# Patient Record
Sex: FEMALE | Race: WHITE | Employment: FULL TIME | URBAN - METROPOLITAN AREA
[De-identification: names, ages, dates, MRNs, and addresses within clinical notes are randomized per-mention and may not be internally consistent; named-entity substitution may affect disease eponyms.]

---

## 2017-02-06 DIAGNOSIS — F33.41 MAJOR DEPRESSIVE DISORDER, RECURRENT, IN PARTIAL REMISSION (H): Primary | ICD-10-CM

## 2017-02-06 DIAGNOSIS — F41.1 GENERALIZED ANXIETY DISORDER: ICD-10-CM

## 2017-02-06 NOTE — TELEPHONE ENCOUNTER
Fluoxetine 20 Mg Capsules     Last Written Prescription Date: 12/9/16  Last Fill Quantity: 60, # refills: 0  Last Office Visit with G primary care provider:  12/19/16        Last PHQ-9 score on record=   PHQ-9 SCORE 12/9/2016   Total Score -   Total Score 12

## 2017-02-07 NOTE — TELEPHONE ENCOUNTER
Pt was to return to clinic in January. Left message for patient to schedule. Short-term refill sent in.    LANCE Vela, RN  [facility]

## 2017-02-17 PROBLEM — F43.10 PTSD (POST-TRAUMATIC STRESS DISORDER): Status: ACTIVE | Noted: 2017-02-17

## 2017-02-20 ENCOUNTER — OFFICE VISIT (OUTPATIENT)
Dept: FAMILY MEDICINE | Facility: CLINIC | Age: 27
End: 2017-02-20
Payer: COMMERCIAL

## 2017-02-20 VITALS
HEIGHT: 66 IN | SYSTOLIC BLOOD PRESSURE: 112 MMHG | BODY MASS INDEX: 24.75 KG/M2 | HEART RATE: 81 BPM | OXYGEN SATURATION: 97 % | DIASTOLIC BLOOD PRESSURE: 66 MMHG | WEIGHT: 154 LBS | TEMPERATURE: 98.3 F | RESPIRATION RATE: 16 BRPM

## 2017-02-20 DIAGNOSIS — F90.2 ATTENTION DEFICIT HYPERACTIVITY DISORDER (ADHD), COMBINED TYPE: ICD-10-CM

## 2017-02-20 DIAGNOSIS — F41.1 GAD (GENERALIZED ANXIETY DISORDER): Primary | ICD-10-CM

## 2017-02-20 PROCEDURE — 99213 OFFICE O/P EST LOW 20 MIN: CPT | Performed by: NURSE PRACTITIONER

## 2017-02-20 RX ORDER — CITALOPRAM HYDROBROMIDE 20 MG/1
20 TABLET ORAL DAILY
Qty: 90 TABLET | Refills: 1 | Status: SHIPPED | OUTPATIENT
Start: 2017-02-20 | End: 2017-05-03

## 2017-02-20 NOTE — PROGRESS NOTES
"  SUBJECTIVE:                                                    Alondra Sanchez is a 26 year old female who presents to clinic today for the following health issues:      Medication Followup of Prozac      Taking Medication as prescribed: yes    Side Effects:  Unsure, loose stools, nightmares, yelling out in sleep    Medication Helping Symptoms:  Helping with energy but seems like its making her more restless     Has been diagnosed with PTSD in the past due to chaotic childhood, but doesn't feel that she is particularly having PTSD related symptoms- not experiencing nightmares, or re-experiencing traumas. Feels that she has an ongoing high level of anxiety due to school, but it has not been particularly any worse lately. Restlessness seemed to have started when she switched antidepressants. Ongoing jitteryness, shaking and tapping foot during class, can't sit still. Feels like her energy level has improved and she likes this side effect.  -------------------------------------    Problem list and histories reviewed & adjusted, as indicated.  Additional history: as documented    BP Readings from Last 3 Encounters:   02/20/17 112/66   12/14/16 109/73   12/09/16 112/65    Wt Readings from Last 3 Encounters:   02/20/17 154 lb (69.9 kg)   12/14/16 154 lb (69.9 kg)   12/09/16 153 lb 6.4 oz (69.6 kg)                  Labs reviewed in EPIC    ROS:  Constitutional, HEENT, cardiovascular, pulmonary, gi and gu systems are negative, except as otherwise noted.    OBJECTIVE:                                                    /66 (BP Location: Right arm)  Pulse 81  Temp 98.3  F (36.8  C) (Oral)  Resp 16  Ht 5' 6\" (1.676 m)  Wt 154 lb (69.9 kg)  LMP  (LMP Unknown)  SpO2 97%  BMI 24.86 kg/m2  Body mass index is 24.86 kg/(m^2).  GENERAL: healthy, alert and no distress  NECK: no adenopathy, no asymmetry, masses, or scars and thyroid normal to palpation  RESP: lungs clear to auscultation - no rales, rhonchi or wheezes  CV: " regular rate and rhythm, normal S1 S2, no S3 or S4, no murmur, click or rub, no peripheral edema and peripheral pulses strong  PSYCH: mentation appears normal, affect normal/bright    Diagnostic Test Results:  No results found for this or any previous visit (from the past 24 hour(s)).     ASSESSMENT/PLAN:                                                      Problem List Items Addressed This Visit     None      Visit Diagnoses     EMILI (generalized anxiety disorder)    -  Primary    Relevant Medications    citalopram (CELEXA) 20 MG tablet    Other Relevant Orders    MENTAL HEALTH REFERRAL    Attention deficit hyperactivity disorder (ADHD), combined type        Relevant Orders    MENTAL HEALTH REFERRAL         Will try switching to another antidepressant to see if sleep issues are perhaps a side effect of prozac. Encouraged her to follow up with psych.    DUNG Munoz [facility]

## 2017-02-20 NOTE — MR AVS SNAPSHOT
After Visit Summary   2/20/2017    Alondra Sanchez    MRN: [id number]           Patient Information     Date Of Birth          1990        Visit Information        Provider Department      2/20/2017 2:00 PM Suki Leon APRN CNP [facility] [place]        Today's Diagnoses     EMILI (generalized anxiety disorder)    -  1    Attention deficit hyperactivity disorder (ADHD), combined type           Follow-ups after your visit        Additional Services     MENTAL HEALTH REFERRAL       Your provider has referred you to: Behavioral Healthcare Providers Intake Scheduling [phone]   [email or web address]    All scheduling is subject to the client's specific insurance plan & benefits, provider/location availability, and provider clinical specialities.  Please arrive 15 minutes early for your first appointment and bring your completed paperwork.    Please be aware that coverage of these services is subject to the terms and limitations of your health insurance plan.  Call member services at your health plan with any benefit or coverage questions.                  Who to contact     If you have questions or need follow up information about today's clinic visit or your schedule please contact [facility] [place] directly at [phone].  Normal or non-critical lab and imaging results will be communicated to you by MyChart, letter or phone within 4 business days after the clinic has received the results. If you do not hear from us within 7 days, please contact the clinic through [employer] or phone. If you have a critical or abnormal lab result, we will notify you by phone as soon as possible.  Submit refill requests through [employer] or call your pharmacy and they will forward the refill request to us. Please allow 3 business days for your refill to be completed.          Additional Information About Your Visit        [employer] Information     [employer] gives you secure access to your  "electronic health record. If you see a primary care provider, you can also send messages to your care team and make appointments. If you have questions, please call your primary care clinic.  If you do not have a primary care provider, please call [phone] and they will assist you.        Care EveryWhere ID     This is your Care EveryWhere ID. This could be used by other organizations to access your [place] medical records  [id number]        Your Vitals Were     Pulse Temperature Respirations Height Last Period Pulse Oximetry    81 98.3  F (36.8  C) (Oral) 16 5' 6\" (1.676 m) (LMP Unknown) 97%    BMI (Body Mass Index)                   24.86 kg/m2            Blood Pressure from Last 3 Encounters:   02/20/17 112/66   12/14/16 109/73   12/09/16 112/65    Weight from Last 3 Encounters:   02/20/17 154 lb (69.9 kg)   12/14/16 154 lb (69.9 kg)   12/09/16 153 lb 6.4 oz (69.6 kg)              We Performed the Following     MENTAL HEALTH REFERRAL          Today's Medication Changes          These changes are accurate as of: 2/20/17  2:59 PM.  If you have any questions, ask your nurse or doctor.               Start taking these medicines.        Dose/Directions    citalopram 20 MG tablet   Commonly known as:  celeXA   Used for:  EMILI (generalized anxiety disorder)   Started by:  Suki Leon APRN CNP        Dose:  20 mg   Take 1 tablet (20 mg) by mouth daily   Quantity:  90 tablet   Refills:  1         Stop taking these medicines if you haven't already. Please contact your care team if you have questions.     FLUoxetine 20 MG capsule   Commonly known as:  PROzac   Stopped by:  Suki Leon APRN CNP                Where to get your medicines      These medications were sent to [facility] [address] [place] [address] AT [address] [place] [address] & [address] [address]    Hours:  24-hours Phone:  [phone]     citalopram 20 MG tablet                " Primary Care Provider Office Phone # Fax #    DUNG Wilkins [phone]       [facility] [address] [address]        Thank you!     Thank you for choosing [facility]  for your care. Our goal is always to provide you with excellent care. Hearing back from our patients is one way we can continue to improve our services. Please take a few minutes to complete the written survey that you may receive in the mail after your visit with us. Thank you!             Your Updated Medication List - Protect others around you: Learn how to safely use, store and throw away your medicines at www.disposemymeds.org.          This list is accurate as of: 2/20/17  2:59 PM.  Always use your most recent med list.                   Brand Name Dispense Instructions for use    citalopram 20 MG tablet    celeXA    90 tablet    Take 1 tablet (20 mg) by mouth daily       Fish Oil 500 MG Caps      Take 2 capsules by mouth daily.       multivitamin per tablet      Take 1 tablet by mouth daily.       nitrofurantoin (macrocrystal-monohydrate) 100 MG capsule    MACROBID    14 capsule    Take 1 capsule (100 mg) by mouth 2 times daily       norethindrone-ethinyl estradiol 1-20 MG-MCG per tablet    JUNEL FE 1/20    84 tablet    Take 1 tablet by mouth daily       SOLUBLE FIBER/PROBIOTICS PO          VITAMIN D PO      Take 5,000 Int'l Units by mouth

## 2017-02-20 NOTE — NURSING NOTE
"Chief Complaint   Patient presents with     Recheck Medication     Prozac       Initial /66 (BP Location: Right arm)  Pulse 81  Temp 98.3  F (36.8  C) (Oral)  Resp 16  Ht 5' 6\" (1.676 m)  Wt 154 lb (69.9 kg)  LMP  (LMP Unknown)  SpO2 97%  BMI 24.86 kg/m2 Estimated body mass index is 24.86 kg/(m^2) as calculated from the following:    Height as of this encounter: 5' 6\" (1.676 m).    Weight as of this encounter: 154 lb (69.9 kg).  Medication Reconciliation: complete     Leonora Bazzi CMA      "

## 2017-03-30 NOTE — PROGRESS NOTES
"Alondra Sanchez is a 26 year old  female, accompanied by her self for a medication check and ADHD and anxiety follow up.      CURRENT CONCERNS:  Want to get on the meds for ADHD  Prozac was great for energy, but \"put the H in ADHD\" and also having night terrors.  Switched from prozac 20 mg to celexa 20 mg one month ago.  Finds it more helpful with anxiety, but also more tired and hard time concentrating.  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    Review of past medical history:     Attention deficit hyperactivity disorder, combined type  Major depressive disorder, recurrent, in partial remission (H)  Generalized anxiety disorder     CURRENT PRESCRIPTIONS:  Celexa 20 Mg      MEDICATION BENEFITS:  Controlled symptoms:  Hyperactivity - motor restlessness and anxiety  Uncontrolled symptoms:  Distractability and Finishing tasks     MEDICATION SIDE EFFECTS:   Has:  Fatigue and tiredness  Denies:  none      OBJECTIVE:  /71 (BP Location: Left arm, Patient Position: Chair, Cuff Size: Adult Regular)  Pulse 80  Temp 96.3  F (35.7  C) (Oral)  Wt 161 lb 3.2 oz (73.1 kg)  SpO2 100%  BMI 26.02 kg/m2  EXAM:GENERAL:  Alert and interactive., EYES:  Normal extra-ocular movements.  PERRLA, LUNGS:  Clear, HEART:  Normal rate and rhythm.  Normal S1 and S2.  No murmurs., ABDOMEN:  Soft, non-tender, no organomegaly., NEURO:  No tics or tremor.  Normal tone and strength. Normal gait and balance.  and MENTAL STATUS EXAM  Appearance: appropriate  Attitude: cooperative  Behavior: normal  Eye Contact: normal  Speech: normal  Orientation: oriented to person , place, time and situation  Mood:  admits anxiety most of time  Affect: normal, bright  Thought Process: clear  Suicidal Ideation: reports no thoughts, no intention  Hallucination: no     ASSESSMENT/PLAN:  (F90.2) Attention deficit hyperactivity disorder, combined type  (primary encounter diagnosis)  Comment:   Plan: amphetamine-dextroamphetamine (ADDERALL XR) 10 "         MG per 24 hr capsule    Interested in starting adderall at this time.  Would like low dose.  Follow-up in one month    (F33.41) Major depressive disorder, recurrent, in partial remission (H)  Comment:   Plan: continue celexa at current dose as starting adderall.  Discussed that we can increase if needed.    (F41.1) Generalized anxiety disorder  Comment:   Plan:       EVAN Mancia

## 2017-03-31 ENCOUNTER — OFFICE VISIT (OUTPATIENT)
Dept: FAMILY MEDICINE | Facility: CLINIC | Age: 27
End: 2017-03-31
Payer: COMMERCIAL

## 2017-03-31 VITALS
DIASTOLIC BLOOD PRESSURE: 71 MMHG | WEIGHT: 161.2 LBS | BODY MASS INDEX: 26.02 KG/M2 | TEMPERATURE: 96.3 F | SYSTOLIC BLOOD PRESSURE: 108 MMHG | HEART RATE: 80 BPM | OXYGEN SATURATION: 100 %

## 2017-03-31 DIAGNOSIS — F33.41 MAJOR DEPRESSIVE DISORDER, RECURRENT, IN PARTIAL REMISSION (H): ICD-10-CM

## 2017-03-31 DIAGNOSIS — F41.1 GENERALIZED ANXIETY DISORDER: ICD-10-CM

## 2017-03-31 DIAGNOSIS — F90.2 ATTENTION DEFICIT HYPERACTIVITY DISORDER, COMBINED TYPE: Primary | ICD-10-CM

## 2017-03-31 PROCEDURE — 99214 OFFICE O/P EST MOD 30 MIN: CPT | Performed by: NURSE PRACTITIONER

## 2017-03-31 RX ORDER — DEXTROAMPHETAMINE SACCHARATE, AMPHETAMINE ASPARTATE MONOHYDRATE, DEXTROAMPHETAMINE SULFATE AND AMPHETAMINE SULFATE 2.5; 2.5; 2.5; 2.5 MG/1; MG/1; MG/1; MG/1
10 CAPSULE, EXTENDED RELEASE ORAL DAILY
Qty: 30 CAPSULE | Refills: 0 | Status: SHIPPED | OUTPATIENT
Start: 2017-03-31 | End: 2017-05-03

## 2017-03-31 NOTE — PATIENT INSTRUCTIONS
Start adderall XL 10 mg daily - caution taking later in the morning as it can impair sleep    Return in one month

## 2017-03-31 NOTE — MR AVS SNAPSHOT
After Visit Summary   3/31/2017    Alondra Sanchez    MRN: [id number]           Patient Information     Date Of Birth          1990        Visit Information        Provider Department      3/31/2017 10:30 AM Mirtha Herrera APRN CNP [facility]        Today's Diagnoses     Attention deficit hyperactivity disorder, combined type    -  1    Major depressive disorder, recurrent, in partial remission (H)        Generalized anxiety disorder          Care Instructions    Start adderall XL 10 mg daily - caution taking later in the morning as it can impair sleep    Return in one month        Follow-ups after your visit        Who to contact     If you have questions or need follow up information about today's clinic visit or your schedule please contact [facility] directly at [phone].  Normal or non-critical lab and imaging results will be communicated to you by MyChart, letter or phone within 4 business days after the clinic has received the results. If you do not hear from us within 7 days, please contact the clinic through [employer] or phone. If you have a critical or abnormal lab result, we will notify you by phone as soon as possible.  Submit refill requests through [employer] or call your pharmacy and they will forward the refill request to us. Please allow 3 business days for your refill to be completed.          Additional Information About Your Visit        MyChart Information     [employer] gives you secure access to your electronic health record. If you see a primary care provider, you can also send messages to your care team and make appointments. If you have questions, please call your primary care clinic.  If you do not have a primary care provider, please call [phone] and they will assist you.        Care EveryWhere ID     This is your Care EveryWhere ID. This could be used by other organizations to access your [place] medical records  [id number]         Your Vitals Were     Pulse Temperature Pulse Oximetry BMI (Body Mass Index)          80 96.3  F (35.7  C) (Oral) 100% 26.02 kg/m2         Blood Pressure from Last 3 Encounters:   03/31/17 108/71   02/20/17 112/66   12/14/16 109/73    Weight from Last 3 Encounters:   03/31/17 161 lb 3.2 oz (73.1 kg)   02/20/17 154 lb (69.9 kg)   12/14/16 154 lb (69.9 kg)              Today, you had the following     No orders found for display         Today's Medication Changes          These changes are accurate as of: 3/31/17 11:17 AM.  If you have any questions, ask your nurse or doctor.               Start taking these medicines.        Dose/Directions    amphetamine-dextroamphetamine 10 MG per 24 hr capsule   Commonly known as:  ADDERALL XR   Used for:  Attention deficit hyperactivity disorder, combined type   Started by:  Mirtha Herrera APRN CNP        Dose:  10 mg   Take 1 capsule (10 mg) by mouth daily   Quantity:  30 capsule   Refills:  0         Stop taking these medicines if you haven't already. Please contact your care team if you have questions.     nitrofurantoin (macrocrystal-monohydrate) 100 MG capsule   Commonly known as:  MACROBID   Stopped by:  Mirtha Herrera APRN CNP                Where to get your medicines      Some of these will need a paper prescription and others can be bought over the counter.  Ask your nurse if you have questions.     Bring a paper prescription for each of these medications     amphetamine-dextroamphetamine 10 MG per 24 hr capsule                Primary Care Provider Office Phone # Fax #    DUNG Wilkins [phone]       [facility] [address]  [facility] [address]        Thank you!     Thank you for choosing [facility] [place]  for your care. Our goal is always to provide you with excellent care. Hearing back from our patients is one way we can continue to improve our services. Please take a few minutes to complete the written survey  that you may receive in the mail after your visit with us. Thank you!             Your Updated Medication List - Protect others around you: Learn how to safely use, store and throw away your medicines at www.disposemymeds.org.          This list is accurate as of: 3/31/17 11:17 AM.  Always use your most recent med list.                   Brand Name Dispense Instructions for use    amphetamine-dextroamphetamine 10 MG per 24 hr capsule    ADDERALL XR    30 capsule    Take 1 capsule (10 mg) by mouth daily       citalopram 20 MG tablet    celeXA    90 tablet    Take 1 tablet (20 mg) by mouth daily       Fish Oil 500 MG Caps      Take 2 capsules by mouth daily.       multivitamin per tablet      Take 1 tablet by mouth daily.       norethindrone-ethinyl estradiol 1-20 MG-MCG per tablet    JUNEL FE 1/20    84 tablet    Take 1 tablet by mouth daily       SOLUBLE FIBER/PROBIOTICS PO          VITAMIN D PO      Take 5,000 Int'l Units by mouth

## 2017-03-31 NOTE — NURSING NOTE
"Chief Complaint   Patient presents with     Behavioral Problem       Initial /71 (BP Location: Left arm, Patient Position: Chair, Cuff Size: Adult Regular)  Pulse 80  Temp 96.3  F (35.7  C) (Oral)  Wt 161 lb 3.2 oz (73.1 kg)  SpO2 100%  BMI 26.02 kg/m2 Estimated body mass index is 26.02 kg/(m^2) as calculated from the following:    Height as of 2/20/17: 5' 6\" (1.676 m).    Weight as of this encounter: 161 lb 3.2 oz (73.1 kg).  Medication Reconciliation: complete     Daryn Gurrola MA      "

## 2017-05-03 ENCOUNTER — OFFICE VISIT (OUTPATIENT)
Dept: FAMILY MEDICINE | Facility: CLINIC | Age: 27
End: 2017-05-03
Payer: COMMERCIAL

## 2017-05-03 VITALS
WEIGHT: 158.6 LBS | BODY MASS INDEX: 25.6 KG/M2 | TEMPERATURE: 98.5 F | SYSTOLIC BLOOD PRESSURE: 103 MMHG | OXYGEN SATURATION: 98 % | DIASTOLIC BLOOD PRESSURE: 70 MMHG | HEART RATE: 91 BPM

## 2017-05-03 DIAGNOSIS — F33.41 MAJOR DEPRESSIVE DISORDER, RECURRENT, IN PARTIAL REMISSION (H): Primary | ICD-10-CM

## 2017-05-03 DIAGNOSIS — F90.2 ATTENTION DEFICIT HYPERACTIVITY DISORDER, COMBINED TYPE: ICD-10-CM

## 2017-05-03 DIAGNOSIS — F41.1 GENERALIZED ANXIETY DISORDER: ICD-10-CM

## 2017-05-03 PROCEDURE — 99214 OFFICE O/P EST MOD 30 MIN: CPT | Performed by: NURSE PRACTITIONER

## 2017-05-03 RX ORDER — DEXTROAMPHETAMINE SACCHARATE, AMPHETAMINE ASPARTATE MONOHYDRATE, DEXTROAMPHETAMINE SULFATE AND AMPHETAMINE SULFATE 2.5; 2.5; 2.5; 2.5 MG/1; MG/1; MG/1; MG/1
10 CAPSULE, EXTENDED RELEASE ORAL DAILY
Qty: 30 CAPSULE | Refills: 0 | Status: SHIPPED | OUTPATIENT
Start: 2017-07-04 | End: 2017-08-03

## 2017-05-03 RX ORDER — DEXTROAMPHETAMINE SACCHARATE, AMPHETAMINE ASPARTATE MONOHYDRATE, DEXTROAMPHETAMINE SULFATE AND AMPHETAMINE SULFATE 2.5; 2.5; 2.5; 2.5 MG/1; MG/1; MG/1; MG/1
10 CAPSULE, EXTENDED RELEASE ORAL DAILY
Qty: 30 CAPSULE | Refills: 0 | Status: SHIPPED | OUTPATIENT
Start: 2017-05-03 | End: 2017-05-03

## 2017-05-03 RX ORDER — DEXTROAMPHETAMINE SACCHARATE, AMPHETAMINE ASPARTATE MONOHYDRATE, DEXTROAMPHETAMINE SULFATE AND AMPHETAMINE SULFATE 2.5; 2.5; 2.5; 2.5 MG/1; MG/1; MG/1; MG/1
10 CAPSULE, EXTENDED RELEASE ORAL DAILY
Qty: 30 CAPSULE | Refills: 0 | Status: SHIPPED | OUTPATIENT
Start: 2017-05-03 | End: 2017-06-02

## 2017-05-03 RX ORDER — FLUOXETINE 10 MG/1
10 CAPSULE ORAL DAILY
Qty: 90 CAPSULE | Refills: 1 | Status: SHIPPED | OUTPATIENT
Start: 2017-05-03 | End: 2017-11-29

## 2017-05-03 RX ORDER — DEXTROAMPHETAMINE SACCHARATE, AMPHETAMINE ASPARTATE MONOHYDRATE, DEXTROAMPHETAMINE SULFATE AND AMPHETAMINE SULFATE 2.5; 2.5; 2.5; 2.5 MG/1; MG/1; MG/1; MG/1
10 CAPSULE, EXTENDED RELEASE ORAL DAILY
Qty: 30 CAPSULE | Refills: 0 | Status: SHIPPED | OUTPATIENT
Start: 2017-07-04 | End: 2017-05-03

## 2017-05-03 RX ORDER — DEXTROAMPHETAMINE SACCHARATE, AMPHETAMINE ASPARTATE MONOHYDRATE, DEXTROAMPHETAMINE SULFATE AND AMPHETAMINE SULFATE 2.5; 2.5; 2.5; 2.5 MG/1; MG/1; MG/1; MG/1
10 CAPSULE, EXTENDED RELEASE ORAL DAILY
Qty: 30 CAPSULE | Refills: 0 | Status: SHIPPED | OUTPATIENT
Start: 2017-06-03 | End: 2017-05-03

## 2017-05-03 RX ORDER — DEXTROAMPHETAMINE SACCHARATE, AMPHETAMINE ASPARTATE MONOHYDRATE, DEXTROAMPHETAMINE SULFATE AND AMPHETAMINE SULFATE 2.5; 2.5; 2.5; 2.5 MG/1; MG/1; MG/1; MG/1
10 CAPSULE, EXTENDED RELEASE ORAL DAILY
Qty: 30 CAPSULE | Refills: 0 | Status: SHIPPED | OUTPATIENT
Start: 2017-06-03 | End: 2017-07-03

## 2017-05-03 NOTE — NURSING NOTE
"Chief Complaint   Patient presents with     A.D.H.D       Initial /70 (BP Location: Left arm, Patient Position: Chair, Cuff Size: Adult Regular)  Pulse 91  Temp 98.5  F (36.9  C) (Oral)  Wt 158 lb 9.6 oz (71.9 kg)  SpO2 98%  BMI 25.6 kg/m2 Estimated body mass index is 25.6 kg/(m^2) as calculated from the following:    Height as of 2/20/17: 5' 6\" (1.676 m).    Weight as of this encounter: 158 lb 9.6 oz (71.9 kg).  Medication Reconciliation: complete     Usha Cardozo CMA    "

## 2017-05-03 NOTE — PROGRESS NOTES
"  SUBJECTIVE:                                                    Alondra Sanchez is a 26 year old female who is here today for a medication check and ADHD follow up.      CURRENT CONCERNS:  Would like to discuss increasing dosage of Adderall and possibly changing anti depressant because it makes her very tired.    First couple days felt mind was alert and body neutral  Noticing celexa causes fatigue.  Takes the dose in the afternoon after eating between 9-2 pm.  Having difficulty waking.  Drinking 2 large cups of coffee daily as well as a Red Bull.  Prozac had caused hyperactivity and impulsivity and possibly night terrors  Depression symptoms are very stable.  Medications are more for covering anxiety    CURRENT PRESCRIPTIONS:    Adderall XR 10 mg in the morning    MEDICATION BENEFITS:  Controlled symptoms:  Hyperactivity - motor restlessness, Attention span, Distractability, Finishing tasks, Impulse control, Peer relations and School failure  Uncontrolled symptoms:  None     MEDICATION SIDE EFFECTS:   Has:  appetite suppression  Denies:  weight loss, insomnia, tics, palpitations, stomach ache, headache, emotional lability, rebound irritability, drowsiness, \"zombie\" effect, growth suppression and dry mouth      Problem list and histories reviewed & adjusted, as indicated.  Additional history: as documented    ROS:  Constitutional, HEENT, cardiovascular, pulmonary, gi and gu systems are negative, except as otherwise noted.    OBJECTIVE:                                                    /70 (BP Location: Left arm, Patient Position: Chair, Cuff Size: Adult Regular)  Pulse 91  Temp 98.5  F (36.9  C) (Oral)  Wt 158 lb 9.6 oz (71.9 kg)  SpO2 98%  BMI 25.6 kg/m2      EXAM:GENERAL:  Alert and interactive.  EYES:  Normal extra-ocular movements.  PERRLA,   LUNGS:  Clear, HEART:  Normal rate and rhythm.  Normal S1 and S2.  No murmurs.,   ABDOMEN:  Soft, non-tender, no organomegaly.,   NEURO:  No tics or tremor.  " Normal tone and strength. Normal gait and balance  MENTAL STATUS EXAM  Appearance: appropriate  Attitude: cooperative  Behavior: normal  Ere Contact: normal  Speech: normal  Orientation: oriented to person , place, time and situation  Mood:  admits sadness and anxiety most of time  Affect: Mood Congruent  Thought Process: clear  Suicidal Ideation: reports thoughts, no intention  Hallucination: no    Diagnostic Test Results:  none      ASSESSMENT/PLAN:                                                    Depression; recurrent episode-- Full remission   Associated with the following complications:    None   Plan:  Medications:   SSRI - change from celexa to prozac      (F33.41) Major depressive disorder, recurrent, in partial remission (H)  (primary encounter diagnosis)  Comment:   Plan: DEPRESSION ACTION PLAN (DAP), FLUoxetine         (PROZAC) 10 MG capsule   Initially considered timing of celexa, however patient does take it later in the day and feels tired on waking.  Will go back to prozac with a short taper off celexa.  Return in 6 weeks.    (F41.1) Generalized anxiety disorder  Comment:   Plan: FLUoxetine (PROZAC) 10 MG capsule            (F90.2) Attention deficit hyperactivity disorder, combined type  Comment:   Plan: amphetamine-dextroamphetamine (ADDERALL XR) 10         MG per 24 hr capsule,         amphetamine-dextroamphetamine (ADDERALL XR) 10         MG per 24 hr capsule,         amphetamine-dextroamphetamine (ADDERALL XR) 10         MG per 24 hr capsule   no changes while changing SSRI, but increase may be warranted      Patient Instructions   Stop celexa and restart prozac at 10 mg  Continue adderall XR 10 mg  Return in 6 weeks      DUNG Valles [facility]

## 2017-05-03 NOTE — MR AVS SNAPSHOT
After Visit Summary   5/3/2017    Alondra Sanchez    MRN: [id number]           Patient Information     Date Of Birth          1990        Visit Information        Provider Department      5/3/2017 10:45 AM Mirtha Herrera APRN CNP [place]        Today's Diagnoses     Major depressive disorder, recurrent, in partial remission (H)    -  1    Generalized anxiety disorder        Attention deficit hyperactivity disorder, combined type          Care Instructions    Stop celexa and restart prozac at 10 mg  Continue adderall XR 10 mg  Return in 6 weeks        Follow-ups after your visit        Who to contact     If you have questions or need follow up information about today's clinic visit or your schedule please contact [facility] directly at [phone].  Normal or non-critical lab and imaging results will be communicated to you by MyChart, letter or phone within 4 business days after the clinic has received the results. If you do not hear from us within 7 days, please contact the clinic through Miyaobabeihart or phone. If you have a critical or abnormal lab result, we will notify you by phone as soon as possible.  Submit refill requests through Spiracur or call your pharmacy and they will forward the refill request to us. Please allow 3 business days for your refill to be completed.          Additional Information About Your Visit        MyChart Information     Spiracur gives you secure access to your electronic health record. If you see a primary care provider, you can also send messages to your care team and make appointments. If you have questions, please call your primary care clinic.  If you do not have a primary care provider, please call [phone] and they will assist you.        Care EveryWhere ID     This is your Care EveryWhere ID. This could be used by other organizations to access your [facility] medical records  [id number]        Your Vitals Were     Pulse  Temperature Pulse Oximetry BMI (Body Mass Index)          91 98.5  F (36.9  C) (Oral) 98% 25.6 kg/m2         Blood Pressure from Last 3 Encounters:   05/03/17 103/70   03/31/17 108/71   02/20/17 112/66    Weight from Last 3 Encounters:   05/03/17 158 lb 9.6 oz (71.9 kg)   03/31/17 161 lb 3.2 oz (73.1 kg)   02/20/17 154 lb (69.9 kg)              We Performed the Following     DEPRESSION ACTION PLAN (DAP)          Today's Medication Changes          These changes are accurate as of: 5/3/17 11:26 AM.  If you have any questions, ask your nurse or doctor.               Start taking these medicines.        Dose/Directions    FLUoxetine 10 MG capsule   Commonly known as:  PROzac   Used for:  Major depressive disorder, recurrent, in partial remission (H), Generalized anxiety disorder        Dose:  10 mg   Take 1 capsule (10 mg) by mouth daily   Quantity:  90 capsule   Refills:  1         These medicines have changed or have updated prescriptions.        Dose/Directions    * amphetamine-dextroamphetamine 10 MG per 24 hr capsule   Commonly known as:  ADDERALL XR   This may have changed:  Another medication with the same name was added. Make sure you understand how and when to take each.   Used for:  Attention deficit hyperactivity disorder, combined type        Dose:  10 mg   Take 1 capsule (10 mg) by mouth daily   Quantity:  30 capsule   Refills:  0       * amphetamine-dextroamphetamine 10 MG per 24 hr capsule   Commonly known as:  ADDERALL XR   This may have changed:  You were already taking a medication with the same name, and this prescription was added. Make sure you understand how and when to take each.   Used for:  Attention deficit hyperactivity disorder, combined type        Dose:  10 mg   Start taking on:  6/3/2017   Take 1 capsule (10 mg) by mouth daily   Quantity:  30 capsule   Refills:  0       * amphetamine-dextroamphetamine 10 MG per 24 hr capsule   Commonly known as:  ADDERALL XR   This may have changed:  You  were already taking a medication with the same name, and this prescription was added. Make sure you understand how and when to take each.   Used for:  Attention deficit hyperactivity disorder, combined type        Dose:  10 mg   Start taking on:  7/4/2017   Take 1 capsule (10 mg) by mouth daily   Quantity:  30 capsule   Refills:  0       * Notice:  This list has 3 medication(s) that are the same as other medications prescribed for you. Read the directions carefully, and ask your doctor or other care provider to review them with you.      Stop taking these medicines if you haven't already. Please contact your care team if you have questions.     citalopram 20 MG tablet   Commonly known as:  celeXA                Where to get your medicines      These medications were sent to [facility]/pharmacy #[id number] - [place], [place] - [address], [place] [address]     Phone:  [phone]     FLUoxetine 10 MG capsule         Some of these will need a paper prescription and others can be bought over the counter.  Ask your nurse if you have questions.     Bring a paper prescription for each of these medications     amphetamine-dextroamphetamine 10 MG per 24 hr capsule    amphetamine-dextroamphetamine 10 MG per 24 hr capsule    amphetamine-dextroamphetamine 10 MG per 24 hr capsule                Primary Care Provider Office Phone # Fax #    DUNG Wilkins [facility] [phone]       [facility] [address] [address]        Thank you!     Thank you for choosing [facility]  for your care. Our goal is always to provide you with excellent care. Hearing back from our patients is one way we can continue to improve our services. Please take a few minutes to complete the written survey that you may receive in the mail after your visit with us. Thank you!             Your Updated Medication List - Protect others around you: Learn how to safely use, store and  throw away your medicines at www.disposemymeds.org.          This list is accurate as of: 5/3/17 11:26 AM.  Always use your most recent med list.                   Brand Name Dispense Instructions for use    * amphetamine-dextroamphetamine 10 MG per 24 hr capsule    ADDERALL XR    30 capsule    Take 1 capsule (10 mg) by mouth daily       * amphetamine-dextroamphetamine 10 MG per 24 hr capsule   Start taking on:  6/3/2017    ADDERALL XR    30 capsule    Take 1 capsule (10 mg) by mouth daily       * amphetamine-dextroamphetamine 10 MG per 24 hr capsule   Start taking on:  7/4/2017    ADDERALL XR    30 capsule    Take 1 capsule (10 mg) by mouth daily       Fish Oil 500 MG Caps      Take 2 capsules by mouth daily Reported on 5/3/2017       FLUoxetine 10 MG capsule    PROzac    90 capsule    Take 1 capsule (10 mg) by mouth daily       multivitamin per tablet      Take 1 tablet by mouth daily.       norethindrone-ethinyl estradiol 1-20 MG-MCG per tablet    JUNEL FE 1/20    84 tablet    Take 1 tablet by mouth daily       SOLUBLE FIBER/PROBIOTICS PO          VITAMIN D PO      Take 5,000 Int'l Units by mouth       * Notice:  This list has 3 medication(s) that are the same as other medications prescribed for you. Read the directions carefully, and ask your doctor or other care provider to review them with you.

## 2017-05-04 ASSESSMENT — PATIENT HEALTH QUESTIONNAIRE - PHQ9: SUM OF ALL RESPONSES TO PHQ QUESTIONS 1-9: 7

## 2017-08-25 ENCOUNTER — TELEPHONE (OUTPATIENT)
Dept: FAMILY MEDICINE | Facility: CLINIC | Age: 27
End: 2017-08-25

## 2017-08-25 DIAGNOSIS — F90.2 ATTENTION DEFICIT HYPERACTIVITY DISORDER, COMBINED TYPE: Primary | ICD-10-CM

## 2017-08-25 NOTE — TELEPHONE ENCOUNTER
Controlled Substance Refill Request for ADDERALL 10MG XR  Problem List Complete:     checked in past 6 months?    LOV: 5/3/17  The medication is not on patients medication list. When she saw Charlene back in May she was given a script to try over the summer to see how it works and was told to follow up in 6 weeks. Over the course of time, patient did not have any insurance and was not able to follow up, but she is requesting to get a script for the adderall. Any questions she can be reached at [phone].

## 2017-08-25 NOTE — TELEPHONE ENCOUNTER
Patient is requesting refill of:    Amphetamine-dextroamphetamine (ADDERALL XR) 10 MG per 24 hr capsule    Patient was in on 5/3/2017 for clinic visit, per visit note she was to return in six weeks.     Per chart, patient was given three month supply on 5/3/17. Per , the only scripts for adderall that show were filled are on 5/3/17 and one from a previous visit on 4/1/17.    Called patient, she reports she was unable to fill scripts because she lost insurance coverage.     Thank you,   Leslie Christie RN  [facility]

## 2017-08-31 RX ORDER — DEXTROAMPHETAMINE SACCHARATE, AMPHETAMINE ASPARTATE MONOHYDRATE, DEXTROAMPHETAMINE SULFATE AND AMPHETAMINE SULFATE 2.5; 2.5; 2.5; 2.5 MG/1; MG/1; MG/1; MG/1
10 CAPSULE, EXTENDED RELEASE ORAL DAILY
Qty: 30 CAPSULE | Refills: 0 | Status: SHIPPED | OUTPATIENT
Start: 2017-08-31 | End: 2017-12-13 | Stop reason: ALTCHOICE

## 2017-08-31 NOTE — TELEPHONE ENCOUNTER
Spoke with Charlene, she is ok with refill, but wanted to know more about the insurance issue.     Spoke with pt and she said she switched to her 's insurance in jan and he quit. She still works at the hospital. She switched to a different unit and was a .5 and went to a .6. Now has insurance again.     Pt would like to  script when ready.     Script has been pended.    Mi Coon RN  [facility]

## 2017-09-01 ENCOUNTER — TELEPHONE (OUTPATIENT)
Dept: FAMILY MEDICINE | Facility: CLINIC | Age: 27
End: 2017-09-01

## 2017-09-01 NOTE — TELEPHONE ENCOUNTER
Left  for pt to return call to clinic    Requested she assure that we have all of her new insurance info    Call to pharmacy , what is the insurance issue, needs a prior auth    Lee Ann Oliva RN   [facility]

## 2017-09-01 NOTE — TELEPHONE ENCOUNTER
Route to MA pool    Pt needs a PA for her Adderall, she had a recent change in her insurance  Below is the insurance info    [employer], acct no. [id number],  ID number [id number]    RXBIN [id number], RX PCN [id number]    Card padilla Alondra Sanchez    [phone] customer service number    Lee Ann Oliva RN   [facility]

## 2017-09-01 NOTE — TELEPHONE ENCOUNTER
Pt came in to  script for her Adderall yesterday 8/31 and went to go fill it at [facility] [address] and the pharmacy won't fill it.    Pt states the pharmacy said it's an insurance issue and that Charlene Herrera needs to call insurance    Pt can be reached @ [phone] jhony

## 2017-09-05 NOTE — TELEPHONE ENCOUNTER
Pt called her insurance company to ask them about what needed to be done to approve the refill of the medication, and she stated that they told her someone from the clinic needed to call [employer] to renew the medication for the next year. The number she was given is [phone] ext. [phone], and she was told that should renew the medication for the next year. Correct insurance info is in pt's chart now.

## 2017-09-06 NOTE — TELEPHONE ENCOUNTER
Called insurance [employer] and initiated PA over the phone with Nikole, she will send over the PA to pharmacy for review and they will fax or send the determination of the PA back to clinic. Reference number is [id number].  Waiting for denial or approval from insurance.     Daryn Gurrola MA

## 2017-09-08 ENCOUNTER — TELEPHONE (OUTPATIENT)
Dept: FAMILY MEDICINE | Facility: CLINIC | Age: 27
End: 2017-09-08

## 2017-09-08 NOTE — TELEPHONE ENCOUNTER
Prior Authorization: Approved    Approved as of: 09/06/2017      Daryn Gurrola September 8, 2017 at 2:39 PM

## 2017-09-26 NOTE — TELEPHONE ENCOUNTER
Called plan [phone]  to check status of Prior authorization has been approved, approval dates 9/6/2017-9/6/2018  Called [facility] to notify them of PA approval.    Leonora Bazzi, CMA

## 2017-11-29 DIAGNOSIS — F33.41 MAJOR DEPRESSIVE DISORDER, RECURRENT, IN PARTIAL REMISSION (H): ICD-10-CM

## 2017-11-29 DIAGNOSIS — F41.1 GENERALIZED ANXIETY DISORDER: ICD-10-CM

## 2017-11-29 NOTE — TELEPHONE ENCOUNTER
FLUoxetine (PROZAC) 10 MG capsule      Last Written Prescription Date:  5/3/17  Last Fill Quantity: 90,   # refills: 1  Last Office Visit: 5/3/17  Future Office visit:       Routing refill request to provider for review/approval because:  Does not meet protocol criteria

## 2017-11-29 NOTE — TELEPHONE ENCOUNTER
Left VM for pt to return call to clinic    Due to be seen and needs PHQ9    Lee Ann Oliva, RN   [facility]

## 2017-11-30 RX ORDER — FLUOXETINE 10 MG/1
10 CAPSULE ORAL DAILY
Qty: 30 CAPSULE | Refills: 0 | Status: SHIPPED | OUTPATIENT
Start: 2017-11-30 | End: 2017-12-13

## 2017-11-30 NOTE — TELEPHONE ENCOUNTER
PHQ-9 SCORE 6/22/2016 12/9/2016 5/3/2017   Total Score - - -   Total Score 10 12 7     Left phone message and sent Appydrink message advising due for follow up in office visit per last office visit notes 6/3/2017 - advised 6 week F/U office visit - patient is overude    Medication is being filled for 1 time refill only due to:  Patient needs to be seen because overdue for follow up.   Signed Prescriptions:                        Disp   Refills    FLUoxetine (PROZAC) 10 MG capsule          30 cap*0        Sig: Take 1 capsule (10 mg) by mouth daily PLEASE CONTACT           CLINIC TO RECEIVE FUTURE REFILLS [phone]  Authorizing Provider: NIKOLAS SHI  Ordering User: MAIRA BASILIO RN

## 2017-12-12 NOTE — PROGRESS NOTES
"  SUBJECTIVE:                                                    Alondra Sanchez is a 26 year old female who is here today for a medication check and ADHD follow up.      CURRENT CONCERNS:  restlessness, difficulty paying attention, lack of sleep, short term memory issues      CURRENT PRESCRIPTIONS:    Adderall XR 10 mg in the morning     MEDICATION BENEFITS:  Controlled symptoms:  Finishing tasks  Uncontrolled symptoms:  Hyperactivity - motor restlessness, Attention span, Distractability, Impulse control, Frustration tolerance, Peer relations and School failure     MEDICATION SIDE EFFECTS:   Has:  rebound irritability, dry mouth and shakiness, loose stools   Denies:  appetite suppression, weight loss, insomnia, tics, palpitations, stomach ache, headache, emotional lability, drowsiness, \"zombie\" effect and growth suppression      Problem list and histories reviewed & adjusted, as indicated.  Additional history: as documented    Patient Active Problem List   Diagnosis     CARDIOVASCULAR SCREENING; LDL GOAL LESS THAN 130     Adjustment disorder with anxiety     Insomnia     Myopia     Regular astigmatism     Low back pain     Attention deficit hyperactivity disorder, combined type     Major depressive disorder, recurrent, in partial remission (H)     Generalized anxiety disorder     PTSD (post-traumatic stress disorder)     BP Readings from Last 6 Encounters:   05/03/17 103/70   03/31/17 108/71   02/20/17 112/66   12/14/16 109/73   12/09/16 112/65   09/20/16 105/72     Current Outpatient Prescriptions   Medication     FLUoxetine (PROZAC) 10 MG capsule     amphetamine-dextroamphetamine (ADDERALL XR) 10 MG per 24 hr capsule     Probiotic Product (SOLUBLE FIBER/PROBIOTICS PO)     Cholecalciferol (VITAMIN D PO)     Omega-3 Fatty Acids (FISH OIL) 500 MG CAPS     Multiple Vitamin (MULTIVITAMIN) per tablet     No current facility-administered medications for this visit.             ROS:  Constitutional, HEENT, " cardiovascular, pulmonary, gi and gu systems are negative, except as otherwise noted.      OBJECTIVE:                                                    There were no vitals taken for this visit.    Exam:  Constitutional: healthy, alert and no distress    Cardiovascular: negative, PMI normal. No lifts, heaves, or thrills. RRR. No murmurs, clicks gallops or rub  Respiratory: negative, Percussion normal. Good diaphragmatic excursion. Lungs clear  Gastrointestinal: Abdomen soft, non-tender. BS normal. No masses, organomegaly    Neurologic: Gait normal. Reflexes normal and symmetric. Sensation grossly WNL.  Psychiatric: mentation appears normal and affect normal/bright    MENTAL STATUS EXAM  Appearance: appropriate  Attitude: cooperative  Behavior: normal  Eyre Contact: normal  Speech: normal  Orientation: oreinted to person , place, time and situation  Mood:  normal  Affect: Mood Congruient  Thought Process: clear      Diagnostic Test Results:  none      ASSESSMENT/PLAN:                                                        ICD-10-CM    1. Attention deficit hyperactivity disorder (ADHD), unspecified ADHD type F90.9 methylphenidate ER (CONCERTA) 18 MG CR tablet     MENTAL HEALTH REFERRAL  - Adult; Psychiatry and Medication Management; Psychiatry; Other: [facility] [phone]; We will contact you to schedule the appointment or please call with any questions   2. Major depressive disorder, recurrent, in partial remission (H) F33.41 FLUoxetine (PROZAC) 10 MG capsule     MENTAL HEALTH REFERRAL  - Adult; Psychiatry and Medication Management; Psychiatry; Other: [facility] [phone]; We will contact you to schedule the appointment or please call with any questions   3. Generalized anxiety disorder F41.1 FLUoxetine (PROZAC) 10 MG capsule     MENTAL HEALTH REFERRAL  - Adult; Psychiatry and Medication Management; Psychiatry; Other: [facility] [phone];  We will contact you to schedule the appointment or please call with any questions         Will switch to Concerta to see if this controls hyperactivity slightly better and does not contribute to jitteryness.  Asked her to follow up in about 3-4 weeks, before her classes begin to recheck her symptoms.   Suki Leon CNP  Please note greater than 50% of this 30 minute appointment were spent in counseling with the patient of the issues described above in the history of present illness and in the plan, including changing medications

## 2017-12-13 ENCOUNTER — OFFICE VISIT (OUTPATIENT)
Dept: FAMILY MEDICINE | Facility: CLINIC | Age: 27
End: 2017-12-13
Payer: COMMERCIAL

## 2017-12-13 VITALS
HEART RATE: 88 BPM | BODY MASS INDEX: 27.2 KG/M2 | OXYGEN SATURATION: 98 % | SYSTOLIC BLOOD PRESSURE: 114 MMHG | DIASTOLIC BLOOD PRESSURE: 77 MMHG | WEIGHT: 168.5 LBS

## 2017-12-13 DIAGNOSIS — F90.9 ATTENTION DEFICIT HYPERACTIVITY DISORDER (ADHD), UNSPECIFIED ADHD TYPE: Primary | ICD-10-CM

## 2017-12-13 DIAGNOSIS — F33.41 MAJOR DEPRESSIVE DISORDER, RECURRENT, IN PARTIAL REMISSION (H): ICD-10-CM

## 2017-12-13 DIAGNOSIS — F41.1 GENERALIZED ANXIETY DISORDER: ICD-10-CM

## 2017-12-13 PROCEDURE — 99214 OFFICE O/P EST MOD 30 MIN: CPT | Performed by: NURSE PRACTITIONER

## 2017-12-13 RX ORDER — FLUOXETINE 10 MG/1
10 CAPSULE ORAL DAILY
Qty: 30 CAPSULE | Refills: 0 | Status: SHIPPED | OUTPATIENT
Start: 2017-12-13 | End: 2018-03-02

## 2017-12-13 RX ORDER — METHYLPHENIDATE HYDROCHLORIDE 18 MG/1
18 TABLET ORAL EVERY MORNING
Qty: 30 TABLET | Refills: 0 | Status: SHIPPED | OUTPATIENT
Start: 2017-12-13 | End: 2018-03-12

## 2017-12-13 ASSESSMENT — PATIENT HEALTH QUESTIONNAIRE - PHQ9: SUM OF ALL RESPONSES TO PHQ QUESTIONS 1-9: 11

## 2017-12-13 NOTE — MR AVS SNAPSHOT
After Visit Summary   12/13/2017    Alondra Sanchez    MRN: [id number]           Patient Information     Date Of Birth          1990        Visit Information        Provider Department      12/13/2017 3:00 PM Suki Leon APRN CNP [facility]        Today's Diagnoses     Attention deficit hyperactivity disorder (ADHD), unspecified ADHD type    -  1    Major depressive disorder, recurrent, in partial remission (H)        Generalized anxiety disorder           Follow-ups after your visit        Who to contact     If you have questions or need follow up information about today's clinic visit or your schedule please contact [facility] directly at [phone].  Normal or non-critical lab and imaging results will be communicated to you by MyChart, letter or phone within 4 business days after the clinic has received the results. If you do not hear from us within 7 days, please contact the clinic through JustGohart or phone. If you have a critical or abnormal lab result, we will notify you by phone as soon as possible.  Submit refill requests through Openbucks or call your pharmacy and they will forward the refill request to us. Please allow 3 business days for your refill to be completed.          Additional Information About Your Visit        MyChart Information     Openbucks gives you secure access to your electronic health record. If you see a primary care provider, you can also send messages to your care team and make appointments. If you have questions, please call your primary care clinic.  If you do not have a primary care provider, please call [phone] and they will assist you.        Care EveryWhere ID     This is your Care EveryWhere ID. This could be used by other organizations to access your [facility] medical records  [id number]        Your Vitals Were     Pulse Pulse Oximetry BMI (Body Mass Index)             88 98% 27.2 kg/m2          Blood Pressure  from Last 3 Encounters:   12/13/17 114/77   05/03/17 103/70   03/31/17 108/71    Weight from Last 3 Encounters:   12/13/17 168 lb 8 oz (76.4 kg)   05/03/17 158 lb 9.6 oz (71.9 kg)   03/31/17 161 lb 3.2 oz (73.1 kg)              Today, you had the following     No orders found for display         Today's Medication Changes          These changes are accurate as of: 12/13/17  3:39 PM.  If you have any questions, ask your nurse or doctor.               Start taking these medicines.        Dose/Directions    methylphenidate ER 18 MG CR tablet   Commonly known as:  CONCERTA   Used for:  Attention deficit hyperactivity disorder (ADHD), unspecified ADHD type   Started by:  Suik Leon APRN CNP        Dose:  18 mg   Take 1 tablet (18 mg) by mouth every morning   Quantity:  30 tablet   Refills:  0         Stop taking these medicines if you haven't already. Please contact your care team if you have questions.     amphetamine-dextroamphetamine 10 MG per 24 hr capsule   Commonly known as:  ADDERALL XR   Stopped by:  Suki Leon APRN CNP                Where to get your medicines      These medications were sent to [facility]/pharmacy #[id number] - [place], [place] - [address], [place] [address]     Phone:  [phone]     FLUoxetine 10 MG capsule         Some of these will need a paper prescription and others can be bought over the counter.  Ask your nurse if you have questions.     Bring a paper prescription for each of these medications     methylphenidate ER 18 MG CR tablet                Primary Care Provider Office Phone # Fax #    DUNG Wilkins [phone]       [facility] [address]  [facility] [address]        Equal Access to Services     [facility] AH: Aren Archuleta, waanali lubrenda, roque kaaloscar chen, afsaneh engel. So [facility] [phone].    ATENCIÓN: Si craig smalls, yee abreu santos  disposición servicios gratuitos de asistencia lingüística. Kristina coley [phone].    We comply with applicable federal civil rights laws and [place] laws. We do not discriminate on the basis of race, color, national origin, age, disability, sex, sexual orientation, or gender identity.            Thank you!     Thank you for choosing [facility]  for your care. Our goal is always to provide you with excellent care. Hearing back from our patients is one way we can continue to improve our services. Please take a few minutes to complete the written survey that you may receive in the mail after your visit with us. Thank you!             Your Updated Medication List - Protect others around you: Learn how to safely use, store and throw away your medicines at www.disposemymeds.org.          This list is accurate as of: 12/13/17  3:39 PM.  Always use your most recent med list.                   Brand Name Dispense Instructions for use Diagnosis    Fish Oil 500 MG Caps      Take 2 capsules by mouth daily Reported on 5/3/2017        FLUoxetine 10 MG capsule    PROzac    30 capsule    Take 1 capsule (10 mg) by mouth daily PLEASE CONTACT CLINIC TO RECEIVE FUTURE REFILLS [phone]    Major depressive disorder, recurrent, in partial remission (H), Generalized anxiety disorder       methylphenidate ER 18 MG CR tablet    CONCERTA    30 tablet    Take 1 tablet (18 mg) by mouth every morning    Attention deficit hyperactivity disorder (ADHD), unspecified ADHD type       multivitamin per tablet      Take 1 tablet by mouth daily.    Well woman exam with routine gynecological exam       SOLUBLE FIBER/PROBIOTICS PO           VITAMIN D PO      Take 5,000 Int'l Units by mouth

## 2017-12-13 NOTE — NURSING NOTE
"Chief Complaint   Patient presents with     Behavioral Problem       Initial /77 (BP Location: Left arm, Patient Position: Chair, Cuff Size: Adult Regular)  Pulse 88  Wt 168 lb 8 oz (76.4 kg)  SpO2 98%  BMI 27.2 kg/m2 Estimated body mass index is 27.2 kg/(m^2) as calculated from the following:    Height as of 2/20/17: 5' 6\" (1.676 m).    Weight as of this encounter: 168 lb 8 oz (76.4 kg).  Medication Reconciliation: complete     Usha Cardozo CMA    "

## 2017-12-21 ENCOUNTER — TELEPHONE (OUTPATIENT)
Dept: FAMILY MEDICINE | Facility: CLINIC | Age: 27
End: 2017-12-21

## 2017-12-21 NOTE — TELEPHONE ENCOUNTER
Prior Authorization Retail Medication Request  Medication/Dose: Methlyphenidate ER 18mg Tab  Diagnosis and ICD code: Attention deficit hyperactivity disorder (ADHD), unspecified ADHD type [F90.9]  New/Renewal/Insurance Change PA: NEW  Previously Tried and Failed Therapies: Adderall XR    Insurance ID (if provided): [id number]  Insurance Phone (if provided): [phone]    Any additional info from fax request:     If you received a fax notification from an outside Pharmacy: [facility]  Pharmacy Name:[facility]  Pharmacy #:[phone]  Pharmacy Fax:[phone]

## 2017-12-21 NOTE — TELEPHONE ENCOUNTER
Central Prior Authorization Team   Phone: [phone]    PA Initiation    Medication: Methlyphenidate ER 18mg Tab  Insurance Company: [employer] - Phone [phone] Fax [phone]  Pharmacy Filling the Rx: [id number] - [place], [place] - [address]  Filling Pharmacy Phone: [phone]  Filling Pharmacy Fax:    Start Date: 12/21/2017

## 2017-12-26 NOTE — TELEPHONE ENCOUNTER
Prior Authorization Approval    Authorization Effective Date: 12/22/2017  Authorization Expiration Date: 12/22/2018  Medication: Methlyphenidate ER 18mg Tab  Approved Dose/Quantity:    Reference #:     Insurance Company: [employer] - Phone [phone] Fax [phone]  Expected CoPay: 81.22     CoPay Card Available:      Foundation Assistance Needed:    Which Pharmacy is filling the prescription (Not needed for infusion/clinic administered): [id number] - [place], [place] - [address]  Pharmacy Notified: Yes  Patient Notified: Yes

## 2018-03-12 ENCOUNTER — OFFICE VISIT (OUTPATIENT)
Dept: FAMILY MEDICINE | Facility: CLINIC | Age: 28
End: 2018-03-12
Payer: COMMERCIAL

## 2018-03-12 VITALS
TEMPERATURE: 97.7 F | HEART RATE: 79 BPM | SYSTOLIC BLOOD PRESSURE: 98 MMHG | WEIGHT: 172.7 LBS | OXYGEN SATURATION: 96 % | DIASTOLIC BLOOD PRESSURE: 62 MMHG | BODY MASS INDEX: 27.87 KG/M2

## 2018-03-12 DIAGNOSIS — F90.9 ATTENTION DEFICIT HYPERACTIVITY DISORDER (ADHD), UNSPECIFIED ADHD TYPE: ICD-10-CM

## 2018-03-12 DIAGNOSIS — F90.2 ATTENTION DEFICIT HYPERACTIVITY DISORDER, COMBINED TYPE: ICD-10-CM

## 2018-03-12 DIAGNOSIS — F33.41 MAJOR DEPRESSIVE DISORDER, RECURRENT, IN PARTIAL REMISSION (H): Primary | ICD-10-CM

## 2018-03-12 DIAGNOSIS — F41.1 GENERALIZED ANXIETY DISORDER: ICD-10-CM

## 2018-03-12 PROCEDURE — 99214 OFFICE O/P EST MOD 30 MIN: CPT | Performed by: PHYSICIAN ASSISTANT

## 2018-03-12 RX ORDER — METHYLPHENIDATE HYDROCHLORIDE 18 MG/1
18 TABLET ORAL EVERY MORNING
Qty: 30 TABLET | Refills: 0 | Status: SHIPPED | OUTPATIENT
Start: 2018-04-12 | End: 2018-06-28 | Stop reason: ALTCHOICE

## 2018-03-12 RX ORDER — BUPROPION HYDROCHLORIDE 150 MG/1
150 TABLET ORAL EVERY MORNING
Qty: 45 TABLET | Refills: 1 | Status: SHIPPED | OUTPATIENT
Start: 2018-03-12 | End: 2018-06-28

## 2018-03-12 RX ORDER — METHYLPHENIDATE HYDROCHLORIDE 18 MG/1
18 TABLET ORAL EVERY MORNING
Qty: 30 TABLET | Refills: 0 | Status: SHIPPED | OUTPATIENT
Start: 2018-03-12 | End: 2018-06-28 | Stop reason: ALTCHOICE

## 2018-03-12 ASSESSMENT — ANXIETY QUESTIONNAIRES
2. NOT BEING ABLE TO STOP OR CONTROL WORRYING: MORE THAN HALF THE DAYS
7. FEELING AFRAID AS IF SOMETHING AWFUL MIGHT HAPPEN: MORE THAN HALF THE DAYS
6. BECOMING EASILY ANNOYED OR IRRITABLE: MORE THAN HALF THE DAYS
1. FEELING NERVOUS, ANXIOUS, OR ON EDGE: NEARLY EVERY DAY
GAD7 TOTAL SCORE: 17
3. WORRYING TOO MUCH ABOUT DIFFERENT THINGS: NEARLY EVERY DAY
5. BEING SO RESTLESS THAT IT IS HARD TO SIT STILL: NEARLY EVERY DAY

## 2018-03-12 ASSESSMENT — PATIENT HEALTH QUESTIONNAIRE - PHQ9: 5. POOR APPETITE OR OVEREATING: MORE THAN HALF THE DAYS

## 2018-03-12 NOTE — MR AVS SNAPSHOT
After Visit Summary   3/12/2018    Alondra Sanchez    MRN: [id number]           Patient Information     Date Of Birth          1990        Visit Information        Provider Department      3/12/2018 4:00 PM Vivien Pichardo PA-C [facility] [place]        Today's Diagnoses     Major depressive disorder, recurrent, in partial remission (H)    -  1    Generalized anxiety disorder        Attention deficit hyperactivity disorder, combined type        Attention deficit hyperactivity disorder (ADHD), unspecified ADHD type          Care Instructions    Stop prozac  Start zoloft instead (start tomorrow night)  Start Wellbutrin in the morning  Use concerta as needed  Call or MEDSEEKhart in 6 weeks for recheck. Plan is to increase zoloft to 100-125 mg daily depending on how you're feeling  The goal is for you to feel your anxiety is tolerable, not gone  Return to clinic for any new or worsening symptoms or go to ER Urgent care in off hours            Follow-ups after your visit        Who to contact     If you have questions or need follow up information about today's clinic visit or your schedule please contact [facility] directly at [phone].  Normal or non-critical lab and imaging results will be communicated to you by Owlinhart, letter or phone within 4 business days after the clinic has received the results. If you do not hear from us within 7 days, please contact the clinic through Social T[employer] or phone. If you have a critical or abnormal lab result, we will notify you by phone as soon as possible.  Submit refill requests through [employer] or call your pharmacy and they will forward the refill request to us. Please allow 3 business days for your refill to be completed.          Additional Information About Your Visit        Owlinhart Information     [employer] gives you secure access to your electronic health record. If you see a primary care provider, you can also send messages  to your care team and make appointments. If you have questions, please call your primary care clinic.  If you do not have a primary care provider, please call [phone] and they will assist you.        Care EveryWhere ID     This is your Care EveryWhere ID. This could be used by other organizations to access your [facility] medical records  [id number]        Your Vitals Were     Pulse Temperature Pulse Oximetry BMI (Body Mass Index)          79 97.7  F (36.5  C) (Oral) 96% 27.87 kg/m2         Blood Pressure from Last 3 Encounters:   03/12/18 98/62   12/13/17 114/77   05/03/17 103/70    Weight from Last 3 Encounters:   03/12/18 172 lb 11.2 oz (78.3 kg)   12/13/17 168 lb 8 oz (76.4 kg)   05/03/17 158 lb 9.6 oz (71.9 kg)              Today, you had the following     No orders found for display         Today's Medication Changes          These changes are accurate as of 3/12/18  4:42 PM.  If you have any questions, ask your nurse or doctor.               Start taking these medicines.        Dose/Directions    buPROPion 150 MG 24 hr tablet   Commonly known as:  WELLBUTRIN XL   Used for:  Major depressive disorder, recurrent, in partial remission (H), Generalized anxiety disorder, Attention deficit hyperactivity disorder, combined type   Started by:  Vivien Pichardo PA-C        Dose:  150 mg   Take 1 tablet (150 mg) by mouth every morning   Quantity:  45 tablet   Refills:  1       sertraline 50 MG tablet   Commonly known as:  ZOLOFT   Used for:  Major depressive disorder, recurrent, in partial remission (H), Generalized anxiety disorder, Attention deficit hyperactivity disorder, combined type   Started by:  Vivien Pichardo PA-C        Dose:  75 mg   Take 1.5 tablets (75 mg) by mouth daily   Quantity:  90 tablet   Refills:  1         These medicines have changed or have updated prescriptions.        Dose/Directions    * methylphenidate ER 18 MG CR tablet   Commonly known as:  CONCERTA   This  may have changed:  Another medication with the same name was added. Make sure you understand how and when to take each.   Used for:  Attention deficit hyperactivity disorder (ADHD), unspecified ADHD type   Changed by:  Vivien Pichardo PA-C        Dose:  18 mg   Take 1 tablet (18 mg) by mouth every morning   Quantity:  30 tablet   Refills:  0       * methylphenidate ER 18 MG CR tablet   Commonly known as:  CONCERTA   This may have changed:  You were already taking a medication with the same name, and this prescription was added. Make sure you understand how and when to take each.   Used for:  Attention deficit hyperactivity disorder (ADHD), unspecified ADHD type   Changed by:  Vivien Pichardo PA-C        Dose:  18 mg   Start taking on:  4/12/2018   Take 1 tablet (18 mg) by mouth every morning   Quantity:  30 tablet   Refills:  0       * Notice:  This list has 2 medication(s) that are the same as other medications prescribed for you. Read the directions carefully, and ask your doctor or other care provider to review them with you.         Where to get your medicines      These medications were sent to [id number] - [place], [place] - [address], [place] [address]     Phone:  [phone]     buPROPion 150 MG 24 hr tablet    sertraline 50 MG tablet         Some of these will need a paper prescription and others can be bought over the counter.  Ask your nurse if you have questions.     Bring a paper prescription for each of these medications     methylphenidate ER 18 MG CR tablet    methylphenidate ER 18 MG CR tablet                Primary Care Provider Office Phone # Fax #    DUNG Wilkins [facility] [phone]       [facility] [address] [address]        Equal Access to Services     BERTIN BOLTON AH: Aren Archuleta, francois aggarwal, afsaneh vela  laelbert engel. So [facility] [phone].    ATENCIÓN: Si habla slim, tiene a santos disposición servicios gratuitos de asistencia lingüística. Kristina al [phone].    We comply with applicable federal civil rights laws and [place] laws. We do not discriminate on the basis of race, color, national origin, age, disability, sex, sexual orientation, or gender identity.            Thank you!     Thank you for choosing [facility]  for your care. Our goal is always to provide you with excellent care. Hearing back from our patients is one way we can continue to improve our services. Please take a few minutes to complete the written survey that you may receive in the mail after your visit with us. Thank you!             Your Updated Medication List - Protect others around you: Learn how to safely use, store and throw away your medicines at www.disposemymeds.org.          This list is accurate as of 3/12/18  4:42 PM.  Always use your most recent med list.                   Brand Name Dispense Instructions for use Diagnosis    buPROPion 150 MG 24 hr tablet    WELLBUTRIN XL    45 tablet    Take 1 tablet (150 mg) by mouth every morning    Major depressive disorder, recurrent, in partial remission (H), Generalized anxiety disorder, Attention deficit hyperactivity disorder, combined type       Fish Oil 500 MG Caps      Take 2 capsules by mouth daily Reported on 5/3/2017        FLUoxetine 10 MG capsule    PROzac    30 capsule    TAKE 1 CAPSULE (10 MG) BY MOUTH DAILY PLEASE CONTACT CLINIC TO RECEIVE FUTURE REFILLS [phone]    Major depressive disorder, recurrent, in partial remission (H), Generalized anxiety disorder       * methylphenidate ER 18 MG CR tablet    CONCERTA    30 tablet    Take 1 tablet (18 mg) by mouth every morning    Attention deficit hyperactivity disorder (ADHD), unspecified ADHD type       * methylphenidate ER 18 MG CR tablet   Start taking on:  4/12/2018    CONCERTA    30 tablet    Take 1 tablet  (18 mg) by mouth every morning    Attention deficit hyperactivity disorder (ADHD), unspecified ADHD type       multivitamin per tablet      Take 1 tablet by mouth daily.    Well woman exam with routine gynecological exam       sertraline 50 MG tablet    ZOLOFT    90 tablet    Take 1.5 tablets (75 mg) by mouth daily    Major depressive disorder, recurrent, in partial remission (H), Generalized anxiety disorder, Attention deficit hyperactivity disorder, combined type       SOLUBLE FIBER/PROBIOTICS PO           VITAMIN D PO      Take 5,000 Int'l Units by mouth        * Notice:  This list has 2 medication(s) that are the same as other medications prescribed for you. Read the directions carefully, and ask your doctor or other care provider to review them with you.

## 2018-03-12 NOTE — PROGRESS NOTES
"  SUBJECTIVE:   Alondra Sanchez is a 27 year old female who presents to clinic today for the following health issues:    Medication Followup of Concerta 18 mg    Taking Medication as prescribed: yes    Side Effects:  None, but does state more irritable. More \"hangry\"    Medication Helping Symptoms:  yes       Depression Followup    Status since last visit: Worsened with 10 mg making her more tired    See PHQ-9 for current symptoms.  Other associated symptoms: none    Complicating factors:   Significant life event:  No   Current substance abuse:  None but taking 2 10 mg to make it 20 mg  Anxiety or Panic symptoms:  No    PHQ-9 12/9/2016 5/3/2017 12/13/2017   Total Score 12 7 11   Q9: Suicide Ideation Not at all Not at all Not at all       PHQ-9  English  PHQ-9   Any Language  Suicide Assessment Five-step Evaluation and Treatment (SAFE-T)    Has been on 20 mg of prozac, 10 mg makes her feel more tired. Has only been on 20 mg for a week  Was switched to concerta (feels better on this than adderall), but it is expensive so she can't take it every day  Trying to get back into working out. Started this last week with her         Problem list and histories reviewed & adjusted, as indicated.  Additional history: as documented    ROS:  C: NEGATIVE for fever, chills, change in weight  ENDOCRINE: NEGATIVE for temperature intolerance, skin/hair changes  PSYCHIATRIC: NEGATIVE fordelusions, hallucinations, thoughts of hurting someone else and thoughts of self harm    Patient Active Problem List   Diagnosis     CARDIOVASCULAR SCREENING; LDL GOAL LESS THAN 130     Adjustment disorder with anxiety     Insomnia     Myopia     Regular astigmatism     Low back pain     Attention deficit hyperactivity disorder, combined type     Major depressive disorder, recurrent, in partial remission (H)     Generalized anxiety disorder     PTSD (post-traumatic stress disorder)     Past Surgical History:   Procedure Laterality Date     EXCISE " "MASS LOWER EXTREMITY  3/9/2012    Procedure:EXCISE MASS LOWER EXTREMITY; Excision Right Lateral Knee Mass; Surgeon:RUTHY MIGUEL; Location:[facility] OR     ORTHOPEDIC SURGERY         Social History   Substance Use Topics     Smoking status: Former Smoker     Types: Cigarettes     Smokeless tobacco: Never Used      Comment: occasionall 1/wk     Alcohol use 0.0 oz/week     0 Standard drinks or equivalent per week      Comment: social     Family History   Problem Relation Age of Onset     CANCER Mother      terminal cancer --- breast cancer move into her bones     Other - See Comments Mother      Sleep apnea     Anxiety Disorder Mother      MENTAL ILLNESS Mother      Schizophrenia Mother      Thyroid Disease Paternal Grandmother      Other - See Comments Paternal Grandmother      Sleep apnea     Other - See Comments Father      Sleep apnea     MENTAL ILLNESS Brother      CANCER Maternal Grandmother      DIABETES Maternal Grandmother      Glaucoma Maternal Grandfather      Other - See Comments Maternal Grandfather      Sleep apnea     MENTAL ILLNESS Other      Schizophrenia Maternal Uncle      Hypertension No family hx of      CEREBROVASCULAR DISEASE No family hx of      Macular Degeneration No family hx of            Problem list, Medication list, Allergies, and Medical/Social/Surgical histories reviewed in [facility] and updated as appropriate.    OBJECTIVE:                                                    BP 98/62  Pulse 79  Temp 97.7  F (36.5  C) (Oral)  Wt 172 lb 11.2 oz (78.3 kg)  SpO2 96%  BMI 27.87 kg/m2 Body mass index is 27.87 kg/(m^2).   GENERAL:  Alert and oriented x 3.  WD WN  PSYCH: Mood: \"ok\"   Affect:  anxious   Insight: good   Thought process: linear           ASSESSMENT/PLAN:                                                        ICD-10-CM    1. Major depressive disorder, recurrent, in partial remission (H) F33.41 sertraline (ZOLOFT) 50 MG tablet     buPROPion (WELLBUTRIN XL) 150 MG 24 hr tablet   2. " "Generalized anxiety disorder F41.1 sertraline (ZOLOFT) 50 MG tablet     buPROPion (WELLBUTRIN XL) 150 MG 24 hr tablet   3. Attention deficit hyperactivity disorder, combined type F90.2 sertraline (ZOLOFT) 50 MG tablet     buPROPion (WELLBUTRIN XL) 150 MG 24 hr tablet   4. Attention deficit hyperactivity disorder (ADHD), unspecified ADHD type F90.9 methylphenidate ER (CONCERTA) 18 MG CR tablet     methylphenidate ER (CONCERTA) 18 MG CR tablet       PLAN:  1) Health habits reviewed, and patient encouraged to avoid alcohol and exercise regularly.  2) Medications and duration of treatment discussed, possible side effects reviewed, and we'll switch to combo of zoloft and wellbutrin in hopes of helping anxiety but boosting energy level. I don't think the amphetamines are ultimately what she needs as she admits to anxiety being the largest component of her concentration difficulties. She also gets more worked up and fidgety on the medication which is not typical of true ADHD. She'll still use the concerta in the meantime as we try to taper onto the zoloft and wellbutrin.  3) Return appointment in 4-6 weeks. mychart or telephone visit ok.  4) Referral to therapy  Total time 30 minutes, greater than 50% counseling which is discussed in plan above and in patient instructions.   Patient Instructions   Stop prozac  Start zoloft instead (start tomorrow night)  Start Wellbutrin in the morning  Use concerta as needed  Call or mychart in 6 weeks for recheck. Plan is to increase zoloft to 100-125 mg daily depending on how you're feeling  The goal is for you to feel your anxiety is tolerable, not gone  Return to clinic for any new or worsening symptoms or go to ER Urgent care in off hours          Estimated body mass index is 27.87 kg/(m^2) as calculated from the following:    Height as of 2/20/17: 5' 6\" (1.676 m).    Weight as of this encounter: 172 lb 11.2 oz (78.3 kg).       Vivien Ortiz [facility] [place]

## 2018-03-12 NOTE — PATIENT INSTRUCTIONS
Stop prozac  Start zoloft instead (start tomorrow night)  Start Wellbutrin in the morning  Use concerta as needed  Call or mychart in 6 weeks for recheck. Plan is to increase zoloft to 100-125 mg daily depending on how you're feeling  The goal is for you to feel your anxiety is tolerable, not gone  Return to clinic for any new or worsening symptoms or go to ER Urgent care in off hours

## 2018-03-13 ASSESSMENT — ANXIETY QUESTIONNAIRES: GAD7 TOTAL SCORE: 17

## 2018-03-13 ASSESSMENT — PATIENT HEALTH QUESTIONNAIRE - PHQ9: SUM OF ALL RESPONSES TO PHQ QUESTIONS 1-9: 16

## 2018-06-01 ENCOUNTER — TELEPHONE (OUTPATIENT)
Dept: FAMILY MEDICINE | Facility: CLINIC | Age: 28
End: 2018-06-01

## 2018-06-01 NOTE — LETTER
June 1, 2018      Alondra Sanchez  [address]   [place] [address]        Dear Alondra,    In order to ensure we are providing the best quality care, we have reviewed your chart and see that you are due for:    1. Depression follow up    Please call the clinic at your earliest convenience to schedule an appointment.  If you have completed these please contact our office via phone or Mychart to update our records.  We would like to know the date (approximately month and year), the result, and ideally where the procedure was performed.    Thank you for trusting us with your health care.      Sincerely,    Care Team for Suki Leon CNP

## 2018-06-01 NOTE — TELEPHONE ENCOUNTER
Panel Management Review      Patient has the following on her problem list:     Depression / Dysthymia review    Measure:  Needs PHQ-9 score of 4 or less during index window.  Administer PHQ-9 and if score is 5 or more, send encounter to provider for next steps.    5 - 7 month window range: 06/01/2018    PHQ-9 SCORE 5/3/2017 12/13/2017 3/12/2018   Total Score - - -   Total Score 7 11 16       If PHQ-9 recheck is 5 or more, route to provider for next steps.    Patient is due for:  PHQ9 and DAP      Composite cancer screening  Chart review shows that this patient is due/due soon for the following None  Summary:    Patient is due/failing the following:   DAP and PHQ9    Action needed:   Patient needs office visit for depression follow up.    Type of outreach:    Sent letter.    Questions for provider review:    None                                                                                                                                    Daryn Gurrola MA     Chart routed to none.

## 2018-06-28 ENCOUNTER — OFFICE VISIT (OUTPATIENT)
Dept: FAMILY MEDICINE | Facility: CLINIC | Age: 28
End: 2018-06-28
Payer: COMMERCIAL

## 2018-06-28 VITALS
WEIGHT: 173 LBS | OXYGEN SATURATION: 98 % | TEMPERATURE: 98.4 F | SYSTOLIC BLOOD PRESSURE: 108 MMHG | DIASTOLIC BLOOD PRESSURE: 66 MMHG | HEIGHT: 65 IN | BODY MASS INDEX: 28.82 KG/M2 | HEART RATE: 79 BPM | RESPIRATION RATE: 16 BRPM

## 2018-06-28 DIAGNOSIS — Z00.00 ROUTINE GENERAL MEDICAL EXAMINATION AT A HEALTH CARE FACILITY: ICD-10-CM

## 2018-06-28 DIAGNOSIS — F90.2 ATTENTION DEFICIT HYPERACTIVITY DISORDER, COMBINED TYPE: ICD-10-CM

## 2018-06-28 DIAGNOSIS — Z11.4 SCREENING FOR HIV (HUMAN IMMUNODEFICIENCY VIRUS): Primary | ICD-10-CM

## 2018-06-28 DIAGNOSIS — M21.611 BUNION, RIGHT: ICD-10-CM

## 2018-06-28 DIAGNOSIS — F33.41 MAJOR DEPRESSIVE DISORDER, RECURRENT, IN PARTIAL REMISSION (H): ICD-10-CM

## 2018-06-28 DIAGNOSIS — F41.1 GENERALIZED ANXIETY DISORDER: ICD-10-CM

## 2018-06-28 PROCEDURE — 99395 PREV VISIT EST AGE 18-39: CPT | Performed by: NURSE PRACTITIONER

## 2018-06-28 PROCEDURE — 99214 OFFICE O/P EST MOD 30 MIN: CPT | Mod: 25 | Performed by: NURSE PRACTITIONER

## 2018-06-28 RX ORDER — DEXTROAMPHETAMINE SACCHARATE, AMPHETAMINE ASPARTATE, DEXTROAMPHETAMINE SULFATE AND AMPHETAMINE SULFATE 1.25; 1.25; 1.25; 1.25 MG/1; MG/1; MG/1; MG/1
5 TABLET ORAL 2 TIMES DAILY
Qty: 60 TABLET | Refills: 0 | Status: SHIPPED | OUTPATIENT
Start: 2018-07-29 | End: 2018-08-28

## 2018-06-28 RX ORDER — BUPROPION HYDROCHLORIDE 150 MG/1
150 TABLET ORAL EVERY MORNING
Qty: 90 TABLET | Refills: 0 | Status: SHIPPED | OUTPATIENT
Start: 2018-06-28 | End: 2018-09-14

## 2018-06-28 RX ORDER — DEXTROAMPHETAMINE SACCHARATE, AMPHETAMINE ASPARTATE, DEXTROAMPHETAMINE SULFATE AND AMPHETAMINE SULFATE 1.25; 1.25; 1.25; 1.25 MG/1; MG/1; MG/1; MG/1
5 TABLET ORAL 2 TIMES DAILY
Qty: 60 TABLET | Refills: 0 | Status: SHIPPED | OUTPATIENT
Start: 2018-06-28 | End: 2018-07-28

## 2018-06-28 RX ORDER — DEXTROAMPHETAMINE SACCHARATE, AMPHETAMINE ASPARTATE, DEXTROAMPHETAMINE SULFATE AND AMPHETAMINE SULFATE 1.25; 1.25; 1.25; 1.25 MG/1; MG/1; MG/1; MG/1
5 TABLET ORAL 2 TIMES DAILY
Qty: 60 TABLET | Refills: 0 | Status: SHIPPED | OUTPATIENT
Start: 2018-08-29 | End: 2018-09-28

## 2018-06-28 NOTE — MR AVS SNAPSHOT
After Visit Summary   6/28/2018    Alondra Sanchez    MRN: [id number]           Patient Information     Date Of Birth          1990        Visit Information        Provider Department      6/28/2018 4:20 PM Taya Lopez APRN [facility]        Today's Diagnoses     Screening for HIV (human immunodeficiency virus)    -  1    Routine general medical examination at a health care facility        Major depressive disorder, recurrent, in partial remission (H)        Generalized anxiety disorder        Attention deficit hyperactivity disorder, combined type          Care Instructions      Preventive Health Recommendations  Female Ages 26 - 39  Yearly exam:   See your health care provider every year in order to    Review health changes.     Discuss preventive care.      Review your medicines if you your doctor has prescribed any.    Until age 30: Get a Pap test every three years (more often if you have had an abnormal result).    After age 30: Talk to your doctor about whether you should have a Pap test every 3 years or have a Pap test with HPV screening every 5 years.   You do not need a Pap test if your uterus was removed (hysterectomy) and you have not had cancer.  You should be tested each year for STDs (sexually transmitted diseases), if you're at risk.   Talk to your provider about how often to have your cholesterol checked.  If you are at risk for diabetes, you should have a diabetes test (fasting glucose).  Shots: Get a flu shot each year. Get a tetanus shot every 10 years.   Nutrition:     Eat at least 5 servings of fruits and vegetables each day.    Eat whole-grain bread, whole-wheat pasta and brown rice instead of white grains and rice.    Get adequate Calcium and Vitamin D.     Lifestyle    Exercise at least 150 minutes a week (30 minutes a day, 5 days of the week). This will help you control your weight and prevent disease.    Limit alcohol to one drink per  "day.    No smoking.     Wear sunscreen to prevent skin cancer.    See your dentist every six months for an exam and cleaning.            Follow-ups after your visit        Who to contact     If you have questions or need follow up information about today's clinic visit or your schedule please contact [facility] directly at [phone].  Normal or non-critical lab and imaging results will be communicated to you by MyChart, letter or phone within 4 business days after the clinic has received the results. If you do not hear from us within 7 days, please contact the clinic through Clickable[employer] or phone. If you have a critical or abnormal lab result, we will notify you by phone as soon as possible.  Submit refill requests through [employer] or call your pharmacy and they will forward the refill request to us. Please allow 3 business days for your refill to be completed.          Additional Information About Your Visit        Clickablehart Information     [employer] gives you secure access to your electronic health record. If you see a primary care provider, you can also send messages to your care team and make appointments. If you have questions, please call your primary care clinic.  If you do not have a primary care provider, please call [phone] and they will assist you.        Care EveryWhere ID     This is your Care EveryWhere ID. This could be used by other organizations to access your [place] medical records  [id number]        Your Vitals Were     Pulse Temperature Respirations Height Pulse Oximetry BMI (Body Mass Index)    79 98.4  F (36.9  C) (Oral) 16 5' 4.96\" (1.65 m) 98% 28.82 kg/m2       Blood Pressure from Last 3 Encounters:   06/28/18 108/66   03/12/18 98/62   12/13/17 114/77    Weight from Last 3 Encounters:   06/28/18 173 lb (78.5 kg)   03/12/18 172 lb 11.2 oz (78.3 kg)   12/13/17 168 lb 8 oz (76.4 kg)              Today, you had the following     No orders found for display         Today's " Medication Changes          These changes are accurate as of 6/28/18  5:33 PM.  If you have any questions, ask your nurse or doctor.               Start taking these medicines.        Dose/Directions    * amphetamine-dextroamphetamine 5 MG per tablet   Commonly known as:  ADDERALL   Used for:  Attention deficit hyperactivity disorder, combined type   Started by:  Taya Lopez APRN CNP        Dose:  5 mg   Take 1 tablet (5 mg) by mouth 2 times daily   Quantity:  60 tablet   Refills:  0       * amphetamine-dextroamphetamine 5 MG per tablet   Commonly known as:  ADDERALL   Used for:  Attention deficit hyperactivity disorder, combined type   Started by:  Taya Lopez APRN CNP        Dose:  5 mg   Start taking on:  7/29/2018   Take 1 tablet (5 mg) by mouth 2 times daily   Quantity:  60 tablet   Refills:  0       * amphetamine-dextroamphetamine 5 MG per tablet   Commonly known as:  ADDERALL   Used for:  Attention deficit hyperactivity disorder, combined type   Started by:  Taya Lopez APRN CNP        Dose:  5 mg   Start taking on:  8/29/2018   Take 1 tablet (5 mg) by mouth 2 times daily   Quantity:  60 tablet   Refills:  0       * Notice:  This list has 3 medication(s) that are the same as other medications prescribed for you. Read the directions carefully, and ask your doctor or other care provider to review them with you.      Stop taking these medicines if you haven't already. Please contact your care team if you have questions.     methylphenidate ER 18 MG CR tablet   Commonly known as:  CONCERTA   Stopped by:  Taya Lopez APRN CNP                Where to get your medicines      These medications were sent to [facility]/pharmacy #[id number] - [place], [place] - [address], [place] [address]     Phone:  [phone]     buPROPion 150 MG 24 hr tablet         Some of these will need a paper prescription and others can be bought over the counter.  Ask your nurse if you  have questions.     Bring a paper prescription for each of these medications     amphetamine-dextroamphetamine 5 MG per tablet    amphetamine-dextroamphetamine 5 MG per tablet    amphetamine-dextroamphetamine 5 MG per tablet                Primary Care Provider Office Phone # Fax #    DUNG Wilkins [phone]       [address]  [facility] [address]        Equal Access to Services     [facility] : Hadii aad ku hadasho Soomaali, waaxda luqadaha, qaybta kaalmada adeegyada, waxay idiin hayaan adeeg kharash la'aan . So [facility] [phone].    ATENCIÓN: Si habla español, tiene a santos disposición servicios gratuitos de asistencia lingüística. [facility] [phone].    We comply with applicable federal civil rights laws and [place] laws. We do not discriminate on the basis of race, color, national origin, age, disability, sex, sexual orientation, or gender identity.            Thank you!     Thank you for choosing [facility]  [place] your [place]. Our goal is always to provide you with excellent care. Hearing back from our patients is one way we can continue to improve our services. Please take a few minutes to complete the written survey that you may receive in the mail after your visit with us. Thank you!             Your Updated Medication List - Protect others around you: Learn how to safely use, store and throw away your medicines at www.disposemymeds.org.          This list is accurate as of 6/28/18  5:33 PM.  Always use your most recent med list.                   Brand Name Dispense Instructions for use Diagnosis    * amphetamine-dextroamphetamine 5 MG per tablet    ADDERALL    60 tablet    Take 1 tablet (5 mg) by mouth 2 times daily    Attention deficit hyperactivity disorder, combined type       * amphetamine-dextroamphetamine 5 MG per tablet   Start taking on:  7/29/2018    ADDERALL    60 tablet    Take 1 tablet (5 mg) by mouth 2 times daily    Attention deficit  hyperactivity disorder, combined type       * amphetamine-dextroamphetamine 5 MG per tablet   Start taking on:  8/29/2018    ADDERALL    60 tablet    Take 1 tablet (5 mg) by mouth 2 times daily    Attention deficit hyperactivity disorder, combined type       buPROPion 150 MG 24 hr tablet    WELLBUTRIN XL    90 tablet    Take 1 tablet (150 mg) by mouth every morning    Major depressive disorder, recurrent, in partial remission (H), Generalized anxiety disorder, Attention deficit hyperactivity disorder, combined type       Fish Oil 500 MG Caps      Take 2 capsules by mouth daily Reported on 5/3/2017        multivitamin per tablet      Take 1 tablet by mouth daily.    Well woman exam with routine gynecological exam       SOLUBLE FIBER/PROBIOTICS PO           VITAMIN D PO      Take 5,000 Int'l Units by mouth        * Notice:  This list has 3 medication(s) that are the same as other medications prescribed for you. Read the directions carefully, and ask your doctor or other care provider to review them with you.

## 2018-06-28 NOTE — PATIENT INSTRUCTIONS

## 2018-06-28 NOTE — LETTER
My Depression Action Plan  Name: Alondra Sanchez   Date of Birth 1990  Date: 6/28/2018    My doctor: Mritha Herrera   My clinic: [address] [address] [phone]          GREEN    ZONE   Good Control    What it looks like:     Things are going generally well. You have normal up s and down s. You may even feel depressed from time to time, but bad moods usually last less than a day.   What you need to do:  1. Continue to care for yourself (see self care plan)  2. Check your depression survival kit and update it as needed  3. Follow your physician s recommendations including any medication.  4. Do not stop taking medication unless you consult with your physician first.           YELLOW         ZONE Getting Worse    What it looks like:     Depression is starting to interfere with your life.     It may be hard to get out of bed; you may be starting to isolate yourself from others.    Symptoms of depression are starting to last most all day and this has happened for several days.     You may have suicidal thoughts but they are not constant.   What you need to do:     1. Call your care team, your response to treatment will improve if you keep your care team informed of your progress. Yellow periods are signs an adjustment may need to be made.     2. Continue your self-care, even if you have to fake it!    3. Talk to someone in your support network    4. Open up your depression survival kit           RED    ZONE Medical Alert - Get Help    What it looks like:     Depression is seriously interfering with your life.     You may experience these or other symptoms: You can t get out of bed most days, can t work or engage in other necessary activities, you have trouble taking care of basic hygiene, or basic responsibilities, thoughts of suicide or death that will not go away, self-injurious behavior.     What you need to do:  1. Call your  care team and request a same-day appointment. If they are not available (weekends or after hours) call your local crisis line, emergency room or 911.            Depression Self Care Plan / Survival Kit    Self-Care for Depression  Here s the deal. Your body and mind are really not as separate as most people think.  What you do and think affects how you feel and how you feel influences what you do and think. This means if you do things that people who feel good do, it will help you feel better.  Sometimes this is all it takes.  There is also a place for medication and therapy depending on how severe your depression is, so be sure to consult with your medical provider and/ or Behavioral Health Consultant if your symptoms are worsening or not improving.     In order to better manage my stress, I will:    Exercise  Get some form of exercise, every day. This will help reduce pain and release endorphins, the  feel good  chemicals in your brain. This is almost as good as taking antidepressants!  This is not the same as joining a gym and then never going! (they count on that by the way ) It can be as simple as just going for a walk or doing some gardening, anything that will get you moving.      Hygiene   Maintain good hygiene (Get out of bed in the morning, Make your bed, Brush your teeth, Take a shower, and Get dressed like you were going to work, even if you are unemployed).  If your clothes don't fit try to get ones that do.    Diet  I will strive to eat foods that are good for me, drink plenty of water, and avoid excessive sugar, caffeine, alcohol, and other mood-altering substances.  Some foods that are helpful in depression are: complex carbohydrates, B vitamins, flaxseed, fish or fish oil, fresh fruits and vegetables.    Psychotherapy  I agree to participate in Individual Therapy (if recommended).    Medication  If prescribed medications, I agree to take them.  Missing doses can result in serious side effects.  I  understand that drinking alcohol, or other illicit drug use, may cause potential side effects.  I will not stop my medication abruptly without first discussing it with my provider.    Staying Connected With Others  I will stay in touch with my friends, family members, and my primary care provider/team.    Use your imagination  Be creative.  We all have a creative side; it doesn t matter if it s oil painting, sand castles, or mud pies! This will also kick up the endorphins.    Witness Beauty  (AKA stop and smell the roses) Take a look outside, even in mid-winter. Notice colors, textures. Watch the squirrels and birds.     Service to others  Be of service to others.  There is always someone else in need.  By helping others we can  get out of ourselves  and remember the really important things.  This also provides opportunities for practicing all the other parts of the program.    Humor  Laugh and be silly!  Adjust your TV habits for less news and crime-drama and more comedy.    Control your stress  Try breathing deep, massage therapy, biofeedback, and meditation. Find time to relax each day.     My support system    Clinic Contact:  Phone number:    Contact 1:  Phone number:    Contact 2:  Phone number:    Sikh/:  Phone number:    Therapist:  Phone number:    Local crisis center:    Phone number:    Other community support:  Phone number:

## 2018-06-28 NOTE — PROGRESS NOTES
SUBJECTIVE:   CC: Alondra Sanchez is an 27 year old woman who presents for preventive health visit.     Healthy Habits:    Do you get at least three servings of calcium containing foods daily (dairy, green leafy vegetables, etc.)? no, taking calcium and/or vitamin D supplement: no    Amount of exercise or daily activities, outside of work: None    Problems taking medications regularly No    Medication side effects: Yes weight gain and tiredness     Have you had an eye exam in the past two years? Maybe    Do you see a dentist twice per year? yes    Do you have sleep apnea, excessive snoring or daytime drowsiness?yes    MDD, EMILI, ADHD  concerta price went up so doesn't want to take. In the fall when she goes back to school will need to start it again so would like to get it figured out by then.   Requesting rapid release Adderall to see if this is cheaper, had issues with adderall with eating but as long as she eats small meals frequently she was fine  Just cannot fit in counseling due to her schedule and financial, work and school, also financial concerns at times    welbutrin has been off for about a week or so and feels more tired off of it, harder time going to sleep, more ADD that is her norm    zoloft made her very drowsy, zombie      PHQ-2 Score:     PHQ-2 ( 1999 Pfizer) 6/28/2018 12/13/2017   Q1: Little interest or pleasure in doing things 0 0   Q2: Feeling down, depressed or hopeless 0 0   PHQ-2 Score 0 0         Abuse: Current or Past(Physical, Sexual or Emotional)- No  Do you feel safe in your environment - Yes    Social History   Substance Use Topics     Smoking status: Former Smoker     Types: Cigarettes     Smokeless tobacco: Never Used      Comment: occasionall 1/wk     Alcohol use 0.0 oz/week     0 Standard drinks or equivalent per week      Comment: social     If you drink alcohol do you typically have >3 drinks per day or >7 drinks per week? No                     Reviewed orders with patient.   Reviewed health maintenance and updated orders accordingly - Yes  Labs reviewed in EPIC  BP Readings from Last 3 Encounters:   06/28/18 108/66   03/12/18 98/62   12/13/17 114/77    Wt Readings from Last 3 Encounters:   06/28/18 173 lb (78.5 kg)   03/12/18 172 lb 11.2 oz (78.3 kg)   12/13/17 168 lb 8 oz (76.4 kg)                  Patient Active Problem List   Diagnosis     CARDIOVASCULAR SCREENING; LDL GOAL LESS THAN 130     Adjustment disorder with anxiety     Insomnia     Myopia     Regular astigmatism     Low back pain     Attention deficit hyperactivity disorder, combined type     Major depressive disorder, recurrent, in partial remission (H)     Generalized anxiety disorder     PTSD (post-traumatic stress disorder)     Past Surgical History:   Procedure Laterality Date     EXCISE MASS LOWER EXTREMITY  3/9/2012    Procedure:EXCISE MASS LOWER EXTREMITY; Excision Right Lateral Knee Mass; Surgeon:RUTHY MIGUEL; Location:[facility]     ORTHOPEDIC SURGERY         Social History   Substance Use Topics     Smoking status: Former Smoker     Types: Cigarettes     Smokeless tobacco: Never Used      Comment: occasionall 1/wk     Alcohol use 0.0 oz/week     0 Standard drinks or equivalent per week      Comment: social     Family History   Problem Relation Age of Onset     Cancer Mother      terminal cancer --- breast cancer move into her bones     Other - See Comments Mother      Sleep apnea     Anxiety Disorder Mother      Mental Illness Mother      Schizophrenia Mother      Thyroid Disease Paternal Grandmother      Other - See Comments Paternal Grandmother      Sleep apnea     Other - See Comments Father      Sleep apnea     Mental Illness Brother      Cancer Maternal Grandmother      Diabetes Maternal Grandmother      Glaucoma Maternal Grandfather      Other - See Comments Maternal Grandfather      Sleep apnea     Mental Illness Other      Schizophrenia Maternal Uncle      Hypertension No family hx of       Cerebrovascular Disease No family hx of      Macular Degeneration No family hx of          Current Outpatient Prescriptions   Medication Sig Dispense Refill     amphetamine-dextroamphetamine (ADDERALL) 5 MG per tablet Take 1 tablet (5 mg) by mouth 2 times daily 60 tablet 0     [START ON 7/29/2018] amphetamine-dextroamphetamine (ADDERALL) 5 MG per tablet Take 1 tablet (5 mg) by mouth 2 times daily 60 tablet 0     [START ON 8/29/2018] amphetamine-dextroamphetamine (ADDERALL) 5 MG per tablet Take 1 tablet (5 mg) by mouth 2 times daily 60 tablet 0     buPROPion (WELLBUTRIN XL) 150 MG 24 hr tablet Take 1 tablet (150 mg) by mouth every morning 90 tablet 0     Cholecalciferol (VITAMIN D PO) Take 5,000 Int'l Units by mouth        Multiple Vitamin (MULTIVITAMIN) per tablet Take 1 tablet by mouth daily.       Omega-3 Fatty Acids (FISH OIL) 500 MG CAPS Take 2 capsules by mouth daily Reported on 5/3/2017       Probiotic Product (SOLUBLE FIBER/PROBIOTICS PO)        [DISCONTINUED] buPROPion (WELLBUTRIN XL) 150 MG 24 hr tablet Take 1 tablet (150 mg) by mouth every morning (Patient not taking: Reported on 6/28/2018) 45 tablet 1     No Known Allergies  Recent Labs   Lab Test  04/08/16   1545  08/09/12   1611  07/03/12   1728   ALT   --    --   11   CR   --   0.69  0.81   GFRESTIMATED   --   >90  88   GFRESTBLACK   --   >90  >90   POTASSIUM   --   4.7  3.8   TSH  0.98   --   1.83        Mammogram not appropriate for this patient based on age.    Pertinent mammograms are reviewed under the imaging tab.  History of abnormal Pap smear: NO - age 21-29 PAP every 3 years recommended  PAP / HPV 4/8/2016 5/22/2013   PAP NIL NIL     Reviewed and updated as needed this visit by clinical staff         Reviewed and updated as needed this visit by Provider        Past Medical History:   Diagnosis Date     Generalized anxiety disorder       Past Surgical History:   Procedure Laterality Date     EXCISE MASS LOWER EXTREMITY  3/9/2012     "Procedure:EXCISE MASS LOWER EXTREMITY; Excision Right Lateral Knee Mass; Surgeon:RUTHY MIGUEL; Location:[facility]     ORTHOPEDIC SURGERY         ROS:  CONSTITUTIONAL: NEGATIVE for fever, chills, change in weight  INTEGUMENTARU/SKIN: NEGATIVE for worrisome rashes, moles or lesions  EYES: NEGATIVE for vision changes or irritation  ENT: NEGATIVE for ear, mouth and throat problems  RESP: NEGATIVE for significant cough or SOB  BREAST: NEGATIVE for masses, tenderness or discharge  CV: NEGATIVE for chest pain, palpitations or peripheral edema  GI: NEGATIVE for nausea, abdominal pain, heartburn, or change in bowel habits  : NEGATIVE for unusual urinary or vaginal symptoms. Periods are regular.  MUSCULOSKELETAL: Bunions pain worse on the right that are a bit better when using inserts in her shoes otherwise NEGATIVE for significant arthralgias or myalgia  NEURO: NEGATIVE for weakness, dizziness or paresthesias  PSYCHIATRIC: see above NEGATIVE for changes in mood or affect    OBJECTIVE:   /66  Pulse 79  Temp 98.4  F (36.9  C) (Oral)  Resp 16  Ht 5' 4.96\" (1.65 m)  Wt 173 lb (78.5 kg)  SpO2 98%  BMI 28.82 kg/m2  EXAM:  GENERAL: healthy, alert and no distress  EYES: Eyes grossly normal to inspection, PERRL and conjunctivae and sclerae normal  HENT: ear canals and TM's normal, nose and mouth without ulcers or lesions  NECK: no adenopathy, no asymmetry, masses, or scars and thyroid normal to palpation  RESP: lungs clear to auscultation - no rales, rhonchi or wheezes  BREAST: normal without masses, tenderness or nipple discharge and no palpable axillary masses or adenopathy  CV: regular rate and rhythm, normal S1 S2, no S3 or S4, no murmur, click or rub, no peripheral edema and peripheral pulses strong  ABDOMEN: soft, nontender, no hepatosplenomegaly, no masses and bowel sounds normal  MS: bunions mild in feet r>l otherwise no gross musculoskeletal defects noted, no edema  SKIN: no suspicious lesions or " rashes  NEURO: Normal strength and tone, mentation intact and speech normal  BACK: no CVA tenderness, no paralumbar tenderness  PSYCH: mentation appears normal, affect normal/bright    Diagnostic Test Results:  none     ASSESSMENT/PLAN:       ICD-10-CM    1. Screening for HIV (human immunodeficiency virus) Z11.4    2. Routine general medical examination at a health care facility Z00.00    3. Major depressive disorder, recurrent, in partial remission (H) F33.41 buPROPion (WELLBUTRIN XL) 150 MG 24 hr tablet   4. Generalized anxiety disorder F41.1 buPROPion (WELLBUTRIN XL) 150 MG 24 hr tablet   5. Attention deficit hyperactivity disorder, combined type F90.2 buPROPion (WELLBUTRIN XL) 150 MG 24 hr tablet     amphetamine-dextroamphetamine (ADDERALL) 5 MG per tablet     amphetamine-dextroamphetamine (ADDERALL) 5 MG per tablet     amphetamine-dextroamphetamine (ADDERALL) 5 MG per tablet   6. Bunion, right M21.611    complex psych history of ADHD and Anixety, depression, discussed medications with her Primary Care Provider who agreed with plan we discussed in clinic. Stimulants have helped her symptoms, she wanted to discuss options and then didn't want to make changes afterall. Recommended counseling but she can't find time, discussed additional support for finances or stress, [facility] prn and she declines. Follow up within 3 months with Primary Care Provider     Pt wanted to discuss bunions and discussed good shoes, conservative measures and surgery if bad but recommended she avoid if possible and she agrees. Return to Clinic if not improving as expected or any concerns     COUNSELING:   Reviewed preventive health counseling, as reflected in patient instructions       Regular exercise       Healthy diet/nutrition       Vision screening       Osteoporosis Prevention/Bone Health       Safe sex practices/STD prevention       HIV screeninx in teen years, 1x in adult years, and at intervals if high risk    BP Readings from Last  "1 Encounters:   06/28/18 108/66     Estimated body mass index is 28.82 kg/(m^2) as calculated from the following:    Height as of this encounter: 5' 4.96\" (1.65 m).    Weight as of this encounter: 173 lb (78.5 kg).      Weight management plan: Discussed healthy diet and exercise guidelines and patient will follow up in 12 months in clinic to re-evaluate.     reports that she has quit smoking. Her smoking use included Cigarettes. She has never used smokeless tobacco.      Counseling Resources:  ATP IV Guidelines  Pooled Cohorts Equation Calculator  Breast Cancer Risk Calculator  FRAX Risk Assessment  ICSI Preventive Guidelines  Dietary Guidelines for Americans, 2010  USDA's MyPlate  ASA Prophylaxis  Lung CA Screening    DUNG Ledesma CNP  [facility]  "

## 2018-07-20 ENCOUNTER — TELEPHONE (OUTPATIENT)
Dept: FAMILY MEDICINE | Facility: CLINIC | Age: 28
End: 2018-07-20

## 2018-07-20 NOTE — TELEPHONE ENCOUNTER
Prior auth for 5 mg regular Adderall (not extended release).    Pt can be reached @ [phone] [email or web address]

## 2018-07-23 NOTE — TELEPHONE ENCOUNTER
Central Prior Authorization Team   Phone: [phone]      PA Initiation    Medication: adderall  Insurance Company: [employer] - Phone [phone] Fax [phone]  Pharmacy Filling the Rx: [id number] - [place], [place] - [address]  Filling Pharmacy Phone: [phone]  Filling Pharmacy Fax:    Start Date: 7/23/2018

## 2018-07-24 NOTE — TELEPHONE ENCOUNTER
Central Prior Authorization Team   Phone: [phone]      Prior Authorization Approval    Authorization Effective Date: 7/23/2018  Authorization Expiration Date: 7/23/2019  Medication: adderall  Approved Dose/Quantity:    Reference #:     Insurance Company: [employer] - Phone [phone] Fax [phone]  Expected CoPay:       CoPay Card Available:      Foundation Assistance Needed:    Which Pharmacy is filling the prescription (Not needed for infusion/clinic administered): [id number] - [place], [place] - [address]  Pharmacy Notified: Yes  Patient Notified: Yes

## 2018-08-20 ENCOUNTER — TELEPHONE (OUTPATIENT)
Dept: FAMILY MEDICINE | Facility: CLINIC | Age: 28
End: 2018-08-20

## 2018-08-20 NOTE — TELEPHONE ENCOUNTER
PHQ-9 score:    PHQ-9 SCORE 3/12/2018   Total Score -   Total Score 16       Last seen 6/28/18 by GURPREET Lopez and started on 150 Bupropion XL then.       Pt will need a recheck in clinic or E visit for dose adjustment    Attempted to reach, unable. Left message  to call back.  Caroline Barnes RN

## 2018-08-20 NOTE — TELEPHONE ENCOUNTER
Pt is requesting to increase her buPROPion (WELLBUTRIN XL) 150 MG 24 hr tablet, pt states she does not notice a difference.    Pt can be reached @ [phone] jhony

## 2018-08-24 NOTE — TELEPHONE ENCOUNTER
Charlene,     Spoke with patient. She stated that she is requesting to increase her dose of Wellbutrin XL tablets. Currently taking 150 mg daily. 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 She is wondering if she can do an E-visit with you for this or if she needs to come to an in person clinic visit.     Please advise.    Maria E Hoover RN  [facility]

## 2018-08-24 NOTE — TELEPHONE ENCOUNTER
E-visit is ok, but if with me she should initiate it on Wednesday next week because I am out until then.  She has seen Taya, so if she wanted to she could do the e-visit on Tuesday.  EVAN Mancia

## 2018-08-24 NOTE — TELEPHONE ENCOUNTER
PHQ-9 SCORE 5/3/2017 12/13/2017 3/12/2018   Total Score - - -   Total Score 7 11 16     Return call to patient discussed provider response and she is comfortable with this plan    Sent mychart with e-visit instructions and questionnaires to update PHQ-9 and EMILI    Patient verbalized understanding and agrees with plan    Jada Cruz RN

## 2018-08-28 ENCOUNTER — E-VISIT (OUTPATIENT)
Dept: FAMILY MEDICINE | Facility: CLINIC | Age: 28
End: 2018-08-28
Payer: COMMERCIAL

## 2018-08-28 DIAGNOSIS — F33.41 MAJOR DEPRESSIVE DISORDER, RECURRENT, IN PARTIAL REMISSION (H): Primary | ICD-10-CM

## 2018-08-28 PROCEDURE — 99207 ZZC NO BILLABLE SERVICE THIS VISIT: CPT | Performed by: NURSE PRACTITIONER

## 2018-08-28 ASSESSMENT — ANXIETY QUESTIONNAIRES
7. FEELING AFRAID AS IF SOMETHING AWFUL MIGHT HAPPEN: MORE THAN HALF THE DAYS
7. FEELING AFRAID AS IF SOMETHING AWFUL MIGHT HAPPEN: MORE THAN HALF THE DAYS
3. WORRYING TOO MUCH ABOUT DIFFERENT THINGS: MORE THAN HALF THE DAYS
GAD7 TOTAL SCORE: 11
GAD7 TOTAL SCORE: 11
4. TROUBLE RELAXING: SEVERAL DAYS
GAD7 TOTAL SCORE: 11
2. NOT BEING ABLE TO STOP OR CONTROL WORRYING: SEVERAL DAYS
6. BECOMING EASILY ANNOYED OR IRRITABLE: MORE THAN HALF THE DAYS
1. FEELING NERVOUS, ANXIOUS, OR ON EDGE: SEVERAL DAYS
5. BEING SO RESTLESS THAT IT IS HARD TO SIT STILL: MORE THAN HALF THE DAYS

## 2018-08-28 ASSESSMENT — PATIENT HEALTH QUESTIONNAIRE - PHQ9
SUM OF ALL RESPONSES TO PHQ QUESTIONS 1-9: 15
SUM OF ALL RESPONSES TO PHQ QUESTIONS 1-9: 15
10. IF YOU CHECKED OFF ANY PROBLEMS, HOW DIFFICULT HAVE THESE PROBLEMS MADE IT FOR YOU TO DO YOUR WORK, TAKE CARE OF THINGS AT HOME, OR GET ALONG WITH OTHER PEOPLE: SOMEWHAT DIFFICULT

## 2018-08-29 ASSESSMENT — PATIENT HEALTH QUESTIONNAIRE - PHQ9: SUM OF ALL RESPONSES TO PHQ QUESTIONS 1-9: 15

## 2018-08-29 ASSESSMENT — ANXIETY QUESTIONNAIRES: GAD7 TOTAL SCORE: 11

## 2018-09-12 ENCOUNTER — MYC MEDICAL ADVICE (OUTPATIENT)
Dept: FAMILY MEDICINE | Facility: CLINIC | Age: 28
End: 2018-09-12

## 2018-09-12 DIAGNOSIS — F90.2 ATTENTION DEFICIT HYPERACTIVITY DISORDER, COMBINED TYPE: ICD-10-CM

## 2018-09-12 DIAGNOSIS — F33.41 MAJOR DEPRESSIVE DISORDER, RECURRENT, IN PARTIAL REMISSION (H): ICD-10-CM

## 2018-09-12 DIAGNOSIS — F41.1 GENERALIZED ANXIETY DISORDER: ICD-10-CM

## 2018-09-13 NOTE — TELEPHONE ENCOUNTER
Charlene,     Please see [employer] message in response to E-visit 08/28/18.    Maria E Hoover RN  [facility]

## 2018-09-14 ENCOUNTER — TELEPHONE (OUTPATIENT)
Dept: FAMILY MEDICINE | Facility: CLINIC | Age: 28
End: 2018-09-14

## 2018-09-14 RX ORDER — BUPROPION HYDROCHLORIDE 150 MG/1
150 TABLET ORAL EVERY MORNING
Qty: 90 TABLET | Refills: 0 | Status: SHIPPED | OUTPATIENT
Start: 2018-09-14 | End: 2018-12-14

## 2018-09-14 RX ORDER — DEXTROAMPHETAMINE SACCHARATE, AMPHETAMINE ASPARTATE, DEXTROAMPHETAMINE SULFATE AND AMPHETAMINE SULFATE 1.25; 1.25; 1.25; 1.25 MG/1; MG/1; MG/1; MG/1
5 TABLET ORAL 2 TIMES DAILY
Qty: 60 TABLET | Refills: 0 | Status: SHIPPED | OUTPATIENT
Start: 2018-10-28 | End: 2019-04-17

## 2018-09-14 RX ORDER — DEXTROAMPHETAMINE SACCHARATE, AMPHETAMINE ASPARTATE, DEXTROAMPHETAMINE SULFATE AND AMPHETAMINE SULFATE 1.25; 1.25; 1.25; 1.25 MG/1; MG/1; MG/1; MG/1
5 TABLET ORAL 2 TIMES DAILY
Qty: 60 TABLET | Refills: 0 | Status: SHIPPED | OUTPATIENT
Start: 2018-09-28 | End: 2019-04-17

## 2018-09-14 NOTE — TELEPHONE ENCOUNTER
Called patient and left voicemail stating the prescription for amphetamine-dextroamphetamine (ADDERALL) 5 MG per tablet   Is ready and waiting at the

## 2018-11-27 ENCOUNTER — OFFICE VISIT (OUTPATIENT)
Dept: OPTOMETRY | Facility: CLINIC | Age: 28
End: 2018-11-27
Payer: COMMERCIAL

## 2018-11-27 DIAGNOSIS — H52.13 MYOPIA OF BOTH EYES: Primary | ICD-10-CM

## 2018-11-27 ASSESSMENT — VISUAL ACUITY
OD_CC: 20/20
OS_CC: 20/20
CORRECTION_TYPE: GLASSES
METHOD: SNELLEN - LINEAR

## 2018-11-27 ASSESSMENT — REFRACTION_CURRENTRX
OS_CYLINDER: SPHERE
OD_BRAND: J&J ACUVUE OASYS BC 8.4, D 14.0
OS_SPHERE: -4.75
OD_CYLINDER: SPHERE
OD_SPHERE: -5.50
OS_BRAND: J&J ACUVUE OASYS BC 8.4, D 14.0

## 2018-11-27 ASSESSMENT — REFRACTION_MANIFEST
OS_AXIS: 180
OD_CYLINDER: +0.50
OS_SPHERE: -5.00
OD_SPHERE: -6.25
OS_CYLINDER: +0.25
OD_AXIS: 150

## 2018-11-27 ASSESSMENT — TONOMETRY
IOP_METHOD: ICARE
OD_IOP_MMHG: 17
OS_IOP_MMHG: 18

## 2018-11-27 ASSESSMENT — CONF VISUAL FIELD
OD_NORMAL: 1
OS_NORMAL: 1

## 2018-11-27 NOTE — MR AVS SNAPSHOT
After Visit Summary   11/27/2018    Alondra Sanchez    MRN: [id number]           Patient Information     Date Of Birth          1990        Visit Information        Provider Department      11/27/2018 4:00 PM Victorino Padgett, [facility]        Today's Diagnoses     Myopia of both eyes    -  1       Follow-ups after your visit        Who to contact     Please call your clinic at [phone] to:    Ask questions about your health    Make or cancel appointments    Discuss your medicines    Learn about your test results    Speak to your doctor            Additional Information About Your Visit        MyChart Information     [employer] gives you secure access to your electronic health record. If you see a primary care provider, you can also send messages to your care team and make appointments. If you have questions, please call your primary care clinic.  If you do not have a primary care provider, please call [phone] and they will assist you.      [employer] is an electronic gateway that provides easy, online access to your medical records. With [employer], you can request a clinic appointment, read your test results, renew a prescription or communicate with your care team.     To access your existing account, please contact your [facility] or call [phone] for assistance.        Care EveryWhere ID     This is your Care EveryWhere ID. This could be used by other organizations to access your [facility] medical records  [id number]         Blood Pressure from Last 3 Encounters:   06/28/18 108/66   03/12/18 98/62   12/13/17 114/77    Weight from Last 3 Encounters:   06/28/18 78.5 kg (173 lb)   03/12/18 78.3 kg (172 lb 11.2 oz)   12/13/17 76.4 kg (168 lb 8 oz)              We Performed the Following     HC CONTACT LENS FITTING COSMETIC LVL 1 (76499.011)     REFRACTION [24450]        Primary Care Provider Office Phone # Fax #    DUNG Wilkins [phone]  [phone]       [address]  [facility] [address]        Equal Access to Services     BERTIN BOLTON : Hadii jazmine hyde anatoliy Archuleta, waberylda luqadaha, qatachota kabrantda loriegosia, afsaneh behzadin hayaakenny melogarcia venkatasharon chante engel. So [facility] [phone].    ATENCIÓN: Si habla español, tiene a santos disposición servicios gratuitos de asistencia lingüística. Llame al [phone].    We comply with applicable federal civil rights laws and [place] laws. We do not discriminate on the basis of race, color, national origin, age, disability, sex, sexual orientation, or gender identity.            Thank you!     Thank you for choosing [facility]  for your care. Our goal is always to provide you with excellent care. Hearing back from our patients is one way we can continue to improve our services. Please take a few minutes to complete the written survey that you may receive in the mail after your visit with us. Thank you!             Your Updated Medication List - Protect others around you: Learn how to safely use, store and throw away your medicines at www.disposemymeds.org.          This list is accurate as of 11/27/18 11:59 PM.  Always use your most recent med list.                   Brand Name Dispense Instructions for use Diagnosis    * amphetamine-dextroamphetamine 5 MG tablet    ADDERALL    60 tablet    Take 1 tablet (5 mg) by mouth 2 times daily    Attention deficit hyperactivity disorder, combined type       * amphetamine-dextroamphetamine 5 MG tablet    ADDERALL    60 tablet    Take 1 tablet (5 mg) by mouth 2 times daily    Attention deficit hyperactivity disorder, combined type       buPROPion 150 MG 24 hr tablet    WELLBUTRIN XL    90 tablet    Take 1 tablet (150 mg) by mouth every morning    Major depressive disorder, recurrent, in partial remission (H), Generalized anxiety disorder, Attention deficit hyperactivity disorder, combined type       Fish Oil 500 MG Caps      Take 2 capsules by mouth daily Reported on  5/3/2017        multivitamin per tablet      Take 1 tablet by mouth daily.    Well woman exam with routine gynecological exam       SOLUBLE FIBER/PROBIOTICS PO           VITAMIN D PO      Take 5,000 Int'l Units by mouth        * Notice:  This list has 2 medication(s) that are the same as other medications prescribed for you. Read the directions carefully, and ask your doctor or other care provider to review them with you.

## 2018-11-28 ASSESSMENT — CUP TO DISC RATIO
OD_RATIO: 0.3
OS_RATIO: 0.3

## 2018-11-28 ASSESSMENT — SLIT LAMP EXAM - LIDS
COMMENTS: NORMAL
COMMENTS: NORMAL

## 2018-11-28 ASSESSMENT — EXTERNAL EXAM - RIGHT EYE: OD_EXAM: NORMAL

## 2018-11-28 ASSESSMENT — EXTERNAL EXAM - LEFT EYE: OS_EXAM: NORMAL

## 2018-11-28 NOTE — PROGRESS NOTES
Assessment/Plan  (H52.13) Myopia of both eyes  (primary encounter diagnosis)  Plan: REFRACTION [17123], HC CONTACT LENS FITTING COSMETIC LVL 1 (42196.011)        SRx and CL Rx updated and released. Small change to spectacle Rx. Some adaptation to new Rx may be needed. Patient should RTC with prolonged adaptation difficulties.     Contact Lens Billing  V-Code:  - Soft spherical  Final Contact Lens Rx      Brand Sphere Cylinder   Right J&J Acuvue Oasys BC 8.4, D 14.0 -5.50 Sphere   Left J&J Acuvue Oasys BC 8.4, D 14.0 -4.75 Sphere       Expiration Date:  11/27/2020    Replacement:  Every 2 weeks         Price per Unit: $75 fitting fee    These are for cosmetic contact lenses.    Encounter Diagnosis   Name Primary?     Myopia of both eyes Yes        Date of last eye exam: Today        Complete documentation of historical and exam elements from today's encounter can  be found in the full encounter summary report (not reduplicated in this progress  note). I personally obtained the chief complaint(s) and history of present illness. I  confirmed and edited as necessary the review of systems, past medical/surgical  history, family history, social history, and examination findings as documented by  others; and I examined the patient myself. I personally reviewed the relevant tests,  images, and reports as documented above. I formulated and edited as necessary the  assessment and plan and discussed the findings and management plan with the  patient and family.    Victorino Padgett, OD

## 2018-12-14 DIAGNOSIS — F33.41 MAJOR DEPRESSIVE DISORDER, RECURRENT, IN PARTIAL REMISSION (H): ICD-10-CM

## 2018-12-14 DIAGNOSIS — F41.1 GENERALIZED ANXIETY DISORDER: ICD-10-CM

## 2018-12-14 DIAGNOSIS — F90.2 ATTENTION DEFICIT HYPERACTIVITY DISORDER, COMBINED TYPE: ICD-10-CM

## 2018-12-14 RX ORDER — BUPROPION HYDROCHLORIDE 150 MG/1
150 TABLET ORAL EVERY MORNING
Qty: 90 TABLET | Refills: 0 | Status: SHIPPED | OUTPATIENT
Start: 2018-12-14 | End: 2019-04-03

## 2018-12-14 NOTE — TELEPHONE ENCOUNTER
Charlene,    Please review/sign or advise for refill request of buPROPion (WELLBUTRIN XL) 150 MG 24 hr tablet    Routing refill request to provider for review/approval because:  PHQ-9: 15 (08/28/2018)    Per chart review, it does not appear patient has had a office visit regarding depression, only an e-Visit on 08/28/2018    Would you like patient to come in for office visit?    Thank You!  Karli Rendon, RN  Triage Nurse

## 2018-12-14 NOTE — TELEPHONE ENCOUNTER
"Requested Prescriptions   Pending Prescriptions Disp Refills     buPROPion (WELLBUTRIN XL) 150 MG 24 hr tablet 90 tablet 0    Last Written Prescription Date:  09/14/2018  Last Fill Quantity: 90,  # refills: 0   Last office visit: 6/28/2018 with prescribing provider:  E-visit with Charlene 08/28/2018   Future Office Visit:   Sig: Take 1 tablet (150 mg) by mouth every morning    SSRIs Protocol Failed - 12/14/2018  8:36 AM       Failed - PHQ-9 score less than 5 in past 6 months    Please review last PHQ-9 score.          Passed - Medication is Bupropion    If the medication is Bupropion (Wellbutrin), and the patient is taking for smoking cessation; OK to refill.         Passed - Patient is age 18 or older       Passed - No active pregnancy on record       Passed - No positive pregnancy test in last 12 months       Passed - Recent (6 mo) or future (30 days) visit within the authorizing provider's specialty    Patient had office visit in the last 6 months or has a visit in the next 30 days with authorizing provider or within the authorizing provider's specialty.  See \"Patient Info\" tab in inbasket, or \"Choose Columns\" in Meds & Orders section of the refill encounter.            "

## 2019-03-14 NOTE — PROGRESS NOTES
"  SUBJECTIVE:                                                    Alondra Sanchez is a 28 year old female who is here today for a medication check and ADHD follow up.      CURRENT CONCERNS:  Having problems with word finding and low energy and low concentrations, on a good dose of meds will be more productive.      CURRENT PRESCRIPTIONS:  Adderall 5 mg and 150 Wellbutrin     Adderrall: Only taking ~1x week d/t cost. Taking 1x week. When she takes it, she feels more energy, feels more focused, talks faster, feels jittery. Preferred long-lasting, but cannot afford right now. \"feels better on it, then not.\" However, she is concerned it increased her anxiety symptoms. Has anxiety around test taking and hx of panic attack with taking school entrance exam.     Social: Graduated school in December! Taking practice tests. 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    Anxiety: Having anxiety due to being a \"bad test-taker\" and the length of the OT assistant exam. Also anxious d/t an increasing inability to concentrate. States her memory/word recall has been getting progressively worse since she was seen in the Fall.    Depression: Memory/word recall has been \"not good.\" Has also noticed a decline in her energy, which she reports usually worsens during winter.     Unsure if inability to focus is more related to depression or ADHD.    Main goal is to improve concentration without increasing anxiety.       MEDICATION BENEFITS:  Controlled symptoms:  Hyperactivity - motor restlessness, Attention span, Distractability, Finishing tasks, Impulse control and Frustration tolerance  Uncontrolled symptoms:  None     MEDICATION SIDE EFFECTS:   Has:  appetite suppression  Denies:  none      Depression Followup    Status since last visit: Stable     See PHQ-9 for current symptoms.  Other associated symptoms: low energy, sleep a lot     Complicating factors:   Significant life event:  Yes- done with school    Current substance abuse:  " None  Anxiety or Panic symptoms:  No    PHQ 3/12/2018 8/28/2018 3/15/2019   PHQ-9 Total Score 16 15 17   Q9: Suicide Ideation Not at all Not at all Not at all     In the past two weeks have you had thoughts of suicide or self-harm?  No.    Do you have concerns about your personal safety or the safety of others?   No  PHQ-9  English  PHQ-9   Any Language  Suicide Assessment Five-step Evaluation and Treatment (SAFE-T)      Amount of exercise or physical activity: None    Problems taking medications regularly: Yes,  Insurance coverage and using sparingly     Medication side effects: none    Diet: regular (no restrictions)    Problem list and histories reviewed & adjusted, as indicated.  Additional history: as documented    Patient Active Problem List   Diagnosis     CARDIOVASCULAR SCREENING; LDL GOAL LESS THAN 130     Adjustment disorder with anxiety     Insomnia     Myopia     Regular astigmatism     Low back pain     Attention deficit hyperactivity disorder, combined type     Major depressive disorder, recurrent, in partial remission (H)     Generalized anxiety disorder     PTSD (post-traumatic stress disorder)     BP Readings from Last 6 Encounters:   03/15/19 108/72   06/28/18 108/66   03/12/18 98/62   12/13/17 114/77   05/03/17 103/70   03/31/17 108/71     Current Outpatient Medications   Medication     amphetamine-dextroamphetamine (ADDERALL XR) 5 MG 24 hr capsule     [START ON 4/15/2019] amphetamine-dextroamphetamine (ADDERALL XR) 5 MG 24 hr capsule     [START ON 5/16/2019] amphetamine-dextroamphetamine (ADDERALL XR) 5 MG 24 hr capsule     amphetamine-dextroamphetamine (ADDERALL) 5 MG per tablet     amphetamine-dextroamphetamine (ADDERALL) 5 MG per tablet     buPROPion (WELLBUTRIN XL) 150 MG 24 hr tablet     Cholecalciferol (VITAMIN D PO)     Multiple Vitamin (MULTIVITAMIN) per tablet     Omega-3 Fatty Acids (FISH OIL) 500 MG CAPS     Probiotic Product (SOLUBLE FIBER/PROBIOTICS PO)     No current  "facility-administered medications for this visit.          ROS:  Constitutional, HEENT, cardiovascular, pulmonary, gi and gu systems are negative, except as otherwise noted.    OBJECTIVE:                                                    /72   Pulse 82   Temp 98.2  F (36.8  C) (Oral)   Ht 1.685 m (5' 6.34\")   Wt 79.9 kg (176 lb 1.6 oz)   SpO2 97%   BMI 28.13 kg/m      Exam:  GENERAL: healthy, alert and no distress    MENTAL STATUS EXAM  Appearance: appropriate  Attitude: cooperative  Behavior: normal  Eye Contact: normal  Speech: normal  Orientation: oriented to person , place, time and situation  Mood: Elevated  Affect: Mood Congruent  Thought Process: clear  Suicidal Ideation: reports no thoughts, no intention  Hallucination: no    Diagnostic Test Results:  none      ASSESSMENT/PLAN:                                                    Depression; recurrent episode-- Moderate   Associated with the following complications:    adhd and EMILI      ICD-10-CM    1. Attention deficit hyperactivity disorder, combined type F90.2 amphetamine-dextroamphetamine (ADDERALL XR) 5 MG 24 hr capsule     amphetamine-dextroamphetamine (ADDERALL XR) 5 MG 24 hr capsule     amphetamine-dextroamphetamine (ADDERALL XR) 5 MG 24 hr capsule   2. Major depressive disorder, recurrent, in partial remission (H) F33.41 MED THERAPY MANAGE REFERRAL   3. Generalized anxiety disorder F41.1 MED THERAPY MANAGE REFERRAL     PLAN:  -Continue taking Wellbutrin 150mg 1x daily  -Start taking Adderall XR if covered by insurance  -Meet with psychiatric pharmacist to discuss med changes  -Return in 1 month for annual exam, pap, and to reassess status    Kristin Crooks RN, SNM    Present and preformed physical exam with student nurse-family practice. The patient was evaluated and managed, by Kristin Crooks RN, SNP in collaboration with DUNG Alfonso, CNP supervising clinical preceptor.    Documentation written by DUNG Alfonso " [facility]

## 2019-03-15 ENCOUNTER — OFFICE VISIT (OUTPATIENT)
Dept: FAMILY MEDICINE | Facility: CLINIC | Age: 29
End: 2019-03-15
Payer: COMMERCIAL

## 2019-03-15 VITALS
BODY MASS INDEX: 28.3 KG/M2 | HEART RATE: 82 BPM | SYSTOLIC BLOOD PRESSURE: 108 MMHG | DIASTOLIC BLOOD PRESSURE: 72 MMHG | WEIGHT: 176.1 LBS | HEIGHT: 66 IN | TEMPERATURE: 98.2 F | OXYGEN SATURATION: 97 %

## 2019-03-15 DIAGNOSIS — F90.2 ATTENTION DEFICIT HYPERACTIVITY DISORDER, COMBINED TYPE: Primary | ICD-10-CM

## 2019-03-15 DIAGNOSIS — F41.1 GENERALIZED ANXIETY DISORDER: ICD-10-CM

## 2019-03-15 DIAGNOSIS — F33.41 MAJOR DEPRESSIVE DISORDER, RECURRENT, IN PARTIAL REMISSION (H): ICD-10-CM

## 2019-03-15 PROCEDURE — 99214 OFFICE O/P EST MOD 30 MIN: CPT | Performed by: NURSE PRACTITIONER

## 2019-03-15 RX ORDER — DEXTROAMPHETAMINE SACCHARATE, AMPHETAMINE ASPARTATE MONOHYDRATE, DEXTROAMPHETAMINE SULFATE AND AMPHETAMINE SULFATE 1.25; 1.25; 1.25; 1.25 MG/1; MG/1; MG/1; MG/1
5 CAPSULE, EXTENDED RELEASE ORAL DAILY
Qty: 30 CAPSULE | Refills: 0 | Status: SHIPPED | OUTPATIENT
Start: 2019-05-16 | End: 2019-04-17

## 2019-03-15 RX ORDER — DEXTROAMPHETAMINE SACCHARATE, AMPHETAMINE ASPARTATE, DEXTROAMPHETAMINE SULFATE AND AMPHETAMINE SULFATE 1.25; 1.25; 1.25; 1.25 MG/1; MG/1; MG/1; MG/1
5 TABLET ORAL 2 TIMES DAILY
Qty: 60 TABLET | Refills: 0 | Status: CANCELLED | OUTPATIENT
Start: 2019-04-15 | End: 2019-05-15

## 2019-03-15 RX ORDER — DEXTROAMPHETAMINE SACCHARATE, AMPHETAMINE ASPARTATE, DEXTROAMPHETAMINE SULFATE AND AMPHETAMINE SULFATE 1.25; 1.25; 1.25; 1.25 MG/1; MG/1; MG/1; MG/1
5 TABLET ORAL 2 TIMES DAILY
Qty: 60 TABLET | Refills: 0 | Status: CANCELLED | OUTPATIENT
Start: 2019-05-16 | End: 2019-06-15

## 2019-03-15 RX ORDER — DEXTROAMPHETAMINE SACCHARATE, AMPHETAMINE ASPARTATE MONOHYDRATE, DEXTROAMPHETAMINE SULFATE AND AMPHETAMINE SULFATE 1.25; 1.25; 1.25; 1.25 MG/1; MG/1; MG/1; MG/1
5 CAPSULE, EXTENDED RELEASE ORAL DAILY
Qty: 30 CAPSULE | Refills: 0 | Status: SHIPPED | OUTPATIENT
Start: 2019-04-15 | End: 2019-04-17

## 2019-03-15 RX ORDER — DEXTROAMPHETAMINE SACCHARATE, AMPHETAMINE ASPARTATE MONOHYDRATE, DEXTROAMPHETAMINE SULFATE AND AMPHETAMINE SULFATE 1.25; 1.25; 1.25; 1.25 MG/1; MG/1; MG/1; MG/1
5 CAPSULE, EXTENDED RELEASE ORAL DAILY
Qty: 30 CAPSULE | Refills: 0 | Status: SHIPPED | OUTPATIENT
Start: 2019-03-15 | End: 2019-04-14

## 2019-03-15 RX ORDER — DEXTROAMPHETAMINE SACCHARATE, AMPHETAMINE ASPARTATE, DEXTROAMPHETAMINE SULFATE AND AMPHETAMINE SULFATE 1.25; 1.25; 1.25; 1.25 MG/1; MG/1; MG/1; MG/1
5 TABLET ORAL 2 TIMES DAILY
Qty: 60 TABLET | Refills: 0 | Status: CANCELLED | OUTPATIENT
Start: 2019-03-15 | End: 2019-04-14

## 2019-03-15 RX ORDER — BUPROPION HYDROCHLORIDE 150 MG/1
150 TABLET ORAL EVERY MORNING
Qty: 90 TABLET | Refills: 1 | Status: CANCELLED | OUTPATIENT
Start: 2019-03-15

## 2019-03-15 ASSESSMENT — PATIENT HEALTH QUESTIONNAIRE - PHQ9: SUM OF ALL RESPONSES TO PHQ QUESTIONS 1-9: 17

## 2019-03-15 ASSESSMENT — MIFFLIN-ST. JEOR: SCORE: 1540.91

## 2019-03-15 NOTE — PATIENT INSTRUCTIONS
MTM (pharmacist) with psychiatry clinic  Stephany Magaña or Dena Walters  Should be really quick to get in with them    We will try and see if the XR is covered    One month physical with pap

## 2019-03-19 ENCOUNTER — TELEPHONE (OUTPATIENT)
Dept: FAMILY MEDICINE | Facility: CLINIC | Age: 29
End: 2019-03-19

## 2019-03-19 NOTE — TELEPHONE ENCOUNTER
MTM referral from: [facility] visit (referral by provider)    MTM referral outreach attempt #2 on March 19, 2019 at 3:26 PM      Outcome: Patient not reachable after several attempts, will route to MTM Pharmacist/Provider as an FYI. Thank you for the referral.    Bhavya Thao, MTM Coordinator

## 2019-03-19 NOTE — LETTER
March 22nd. 2019      Dear Ms. Laura,      We are writing in regards to a referral that was ordered by your provider at your last office visit. The referral information is below. Please let us know if you have additional questions or concerns.     Your provider has referred you to:   - [facility] (numerous locations) [phone]     - [facility] [phone]    Reason for Referral: depression and anxiety medication management  Stephany Magaña or Dena Walters     The [facility] department will contact you to schedule an appointment.  You may also schedule the appointment by calling [phone].  For [facility] [place] patients, please call [phone] to confirm/schedule your appointment on the next business day.     This service is designed to help you get the most from your medications.  A specially trained Pharmacist will work closely with you and your providers to solve any questions, concerns, issues or problems related to your medications.     Please bring all of your prescription and non-prescription medications (such as vitamins, over-the-counter medications, and herbals) or a detailed medication list to your appointment.     If you have a glucose meter or other home monitoring information, please also bring this to your appointment (i.e. blood glucose log, blood pressure log, pain log, etc.).      Sincerely,   [facility]

## 2019-03-20 NOTE — TELEPHONE ENCOUNTER
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  Could you reach out via [employer], please?  Thanks!  EVAN Macnia

## 2019-03-20 NOTE — TELEPHONE ENCOUNTER
[employer] message sent to patient with MTM referral information    Karli Rendon, RN  Triage Nurse

## 2019-03-28 ENCOUNTER — OFFICE VISIT (OUTPATIENT)
Dept: PHARMACY | Facility: CLINIC | Age: 29
End: 2019-03-28
Payer: COMMERCIAL

## 2019-03-28 VITALS — DIASTOLIC BLOOD PRESSURE: 78 MMHG | HEART RATE: 90 BPM | SYSTOLIC BLOOD PRESSURE: 127 MMHG

## 2019-03-28 DIAGNOSIS — G47.09 OTHER INSOMNIA: ICD-10-CM

## 2019-03-28 DIAGNOSIS — F33.41 MAJOR DEPRESSIVE DISORDER, RECURRENT, IN PARTIAL REMISSION (H): ICD-10-CM

## 2019-03-28 DIAGNOSIS — F41.1 GENERALIZED ANXIETY DISORDER: Primary | ICD-10-CM

## 2019-03-28 DIAGNOSIS — F90.2 ATTENTION DEFICIT HYPERACTIVITY DISORDER, COMBINED TYPE: ICD-10-CM

## 2019-03-28 PROCEDURE — 99605 MTMS BY PHARM NP 15 MIN: CPT | Performed by: PHARMACIST

## 2019-03-28 PROCEDURE — 99607 MTMS BY PHARM ADDL 15 MIN: CPT | Performed by: PHARMACIST

## 2019-03-28 NOTE — PATIENT INSTRUCTIONS
Recommendations from today's MTM visit:                                                    MTM (medication therapy management) is a service provided by a clinical pharmacist designed to help you get the most of out of your medicines.   Today we reviewed what your medicines are for, how to know if they are working, that your medicines are safe and how to make your medicine regimen as easy as possible.     1. Start taking fluoxetine (Prozac) 20mg daily, first thing after you wake up.    2. Try shifting your sleep cycle a little bit earlier.  Create a schedule for when to go to bed and try setting an alarm to remind yourself that it's bedtime.    3. Start increasing your time spent outdoors    Next MTM visit: call to schedule; follow up with PCP on 4/17 and let me know how things are going    To schedule another MTM appointment, please call the clinic directly or you may call the MTM scheduling line at [phone] or toll-free at [phone].     My Clinical Pharmacist's contact information:                                                      It was a pleasure talking with you today!  Please feel free to contact me with any questions or concerns you have.      Stephany Magaña, PharmD  Medication Therapy Management Pharmacist  [facility]  Phone: [phone]    You may receive a survey about the MTM services you received by email and/or US Mail.  I would appreciate your feedback to help me serve you better in the future. Your comments will be anonymous.

## 2019-03-28 NOTE — Clinical Note
Karl Chang,I saw this patient yesterday and we restarted fluoxetine.  She has an appointment with you in three weeks, so I haven't yet set follow up with her.  She is schedule for physical- can you please review how fluoxetine has been going?  We may be able to discontinue bupropion in the future, especially as fluoxetine may increase.Let me know if you have questions! Stephany

## 2019-03-28 NOTE — PROGRESS NOTES
"SUBJECTIVE/OBJECTIVE:                           Alondra Sanchez is a 28 year old female coming in for an initial visit for Medication Therapy Management.  She was referred to me from DUNG Rodriguez CNP.    Chief Complaint: \"I'm always so anxious\"    Allergies/ADRs: Reviewed in Epic  Tobacco: No tobacco use  Alcohol: not currently using  Caffeine: 2-3 cups/day of coffee  PMH: Reviewed in Epic    Medication Adherence/Access:  no issues reported    Depression/Anxiety: Pt currently taking bupropion ER 150mg daily without side effects.  Pt describes that she mostly feels low energy and has word finding difficulties.  Denied low mood, low motivation or social withdrawal.  She maintains interest in enjoyable activities.  Pt endorses significant anxiety and has reportedly been diagnosed with EMILI in the past.  She feels that most of her anxiety is school/test related, which has been a large focus of her life in recent years.  She described always being the last one to finish an exam, which is stressful for her.  She has had panic attacks during exams in the past.    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  She has just submitted paperwork requesting testing accommodations (longer time) and must schedule the exam within 3 months.     Pt did have trial of bupropion ER 300mg in 2016, but felt quite jittery and restless on that dose. She has also tried fluoxetine 20mg (a year ago?), which was the most effective for both depression and anxiety.  She started having night terrors during that time, so medication was stopped, though unclear if related.  Pt reported that she was also consuming large amounts of coffee and energy drinks around that time as well.  She thinks she tried sertraline in the past, but it made her very tired.    ADHD: Pt is currently taking Adderall IR 5mg BID, though often tries to take it only once daily, especially when she wakes up " "late on her days off work (doses are ~7am and noon on work days).  She feels that the medication is most helpful for alertness (\"it's like my cup of coffee\") and sometimes helping with focus.  She sometimes feels that she is more irritable or restless, but unclear whether it's related to underlying anxiety or the medication.  She had tried Adderall XR in the past, which she feels provided a \"smoother\" effect and lasted throughout the day.  It was again recently prescribed, but was expensive at the pharmacy (~$200 copay).  She is unsure if it was a high copay or just not covered under her insurance plan.  She is almost out of her IR formulation and requested a refill.    Sleep: Patient described always having had difficulty with sleep and not wanting to go to bed.  She enjoys staying up late, but has a highly variable work schedule, which is difficult for her to keep a consistent schedule. She works both day and evening shifts, often alternating throughout the week, and every other weekend.  She reported having some difficulty falling asleep, though this is improved.  She typically stays asleep through the night, but describes herself as a light sleeper.  She always wakes feeling tired.  Her caffeine intake has reportedly decreased significantly over the last year and she currently limits to to morning and early afternoon.    Pt reported the following:  Work days: bed at midnight (not asleep until 1-2am), then up at 6am  Non work days: bed at 2-4am and wakes up at noon    Today's Vitals: /78   Pulse 90       ASSESSMENT:                             Not all current medications were reviewed today.     Medication Adherence: excellent, no issues identified    Depression/Anxiety: Pt denied many depression symptoms and treatment of anxiety seems to be current most pressing issue.  Preferred to avoid increasing bupropion to prevent worsening anxiety, especially with history of side effects at a higher dose.  Pt " preferred to start fluoxetine, over escitalopram, due to previous benefit and wanting to avoid starting a brand new medication while trying to study.  Sleep interruption during previous fluoxetine could likely have been due to excessive caffeine intake at that time and discussed that any sleep issues would likely surface within the first few weeks of treatment.  Pt will also plan to schedule her exam soon so that she has a set timeline for studying.    ADHD: Patient will call insurance to determine her prescription deductible and whether Adderall XR may be affordable for her.  Discussed pt's refill request for IR formulation with Karli Rendon RN, who will initiate request to prescriber.    Sleep: Discussed importance of a more consistent sleep schedule, which could likely have some positive impact on all of her symptoms.  Reviewed starting to move bedtime earlier by just 30-60 minutes at first, setting an alarm 30 minutes ahead of time with a reminder.  Pt was agreeable to try these options.    PLAN:                            1. Start fluoxetine 20mg daily    I spent 75 minutes with this patient today. All changes were made via collaborative practice agreement with DUNG Mancia CNP. A copy of the visit note was provided to the patient's primary care provider.    Will follow up in 6-8 weeks.  Pt has PCP appointment in 3 weeks.    The patient was given a summary of these recommendations as an after visit summary.     Stephany Magaña, PharmD  Medication Therapy Management Pharmacist  [facility]  Phone: [phone]

## 2019-03-29 ENCOUNTER — TELEPHONE (OUTPATIENT)
Dept: OPHTHALMOLOGY | Facility: CLINIC | Age: 29
End: 2019-03-29

## 2019-03-29 DIAGNOSIS — F90.2 ATTENTION DEFICIT HYPERACTIVITY DISORDER, COMBINED TYPE: Primary | ICD-10-CM

## 2019-03-29 RX ORDER — DEXTROAMPHETAMINE SACCHARATE, AMPHETAMINE ASPARTATE, DEXTROAMPHETAMINE SULFATE AND AMPHETAMINE SULFATE 1.25; 1.25; 1.25; 1.25 MG/1; MG/1; MG/1; MG/1
5 TABLET ORAL 2 TIMES DAILY
Qty: 60 TABLET | Refills: 0 | Status: SHIPPED | OUTPATIENT
Start: 2019-03-29

## 2019-03-29 NOTE — TELEPHONE ENCOUNTER
Charlene/Provider Pool;   Please see message below. Patient requests new Rx for Adderall IR 5 mg BID dosing to be taken in place of Adderall XR related to copay    New Rx cued (patient will need to bring old prescription back to be shredded)    LOV: 03/15/2019    : - 03/12/2019 #60/30 day supply prescribed by Taya Lpoez    Thank You!  Karli Rendon RN  Triage Nurse

## 2019-03-29 NOTE — TELEPHONE ENCOUNTER
Received [employer] message from [facility] pharmacist:  Patient recently prescribed Adderall XR 5mg, but had a very high copay.  Patient requested a new Rx for Adderall IR 5mg BID, as she is almost out and continues to take that med for the current time

## 2019-03-29 NOTE — TELEPHONE ENCOUNTER
Left voicemail that prescription is read to be picked up at  of clinic.    Please collect old prescription to be shredded.

## 2019-03-29 NOTE — TELEPHONE ENCOUNTER
Last glasses Rx and contact lens Rx mailed to pt  Pt aware may print glasses (not contact) lens Rx from [facility]    Will forward to Dr. Padgett to see if able to upload in [employer] and will forward to Ck kearney for best quote thru  health ordering  Baldemar Nicole RN 4:25 PM 03/29/19         Health Call Center    Phone Message    May a detailed message be left on voicemail: yes    Reason for Call: Other: Pt requests both glasses and contact Rx be sent to her [facility].  Please assist.     Action Taken: Message routed to:  [facility] eye clinic

## 2019-04-03 DIAGNOSIS — F33.41 MAJOR DEPRESSIVE DISORDER, RECURRENT, IN PARTIAL REMISSION (H): ICD-10-CM

## 2019-04-03 DIAGNOSIS — F90.2 ATTENTION DEFICIT HYPERACTIVITY DISORDER, COMBINED TYPE: ICD-10-CM

## 2019-04-03 DIAGNOSIS — F41.1 GENERALIZED ANXIETY DISORDER: ICD-10-CM

## 2019-04-03 RX ORDER — BUPROPION HYDROCHLORIDE 150 MG/1
150 TABLET ORAL EVERY MORNING
Qty: 30 TABLET | Refills: 0 | Status: SHIPPED | OUTPATIENT
Start: 2019-04-03 | End: 2019-04-17

## 2019-04-03 NOTE — TELEPHONE ENCOUNTER
PHQ-9 on 03/15/19 was 17. Per LOV: pt was to f/u in 1 month. She has OV scheduled for 04/17/19.    Medication is being filled for 1 time refill only due to:  Patient needs to be seen because pt due for f/u.     Maria E Hoover RN  [facility]

## 2019-04-03 NOTE — TELEPHONE ENCOUNTER
"Requested Prescriptions   Pending Prescriptions Disp Refills     buPROPion (WELLBUTRIN XL) 150 MG 24 hr tablet 90 tablet 0    Last Written Prescription Date:  12/14/2018  Last Fill Quantity: 90,  # refills: 0   Last office visit: 3/15/2019 with prescribing provider:  03/15/2019   Future Office Visit:   Next 5 appointments (look out 90 days)    Apr 17, 2019  5:00 PM CDT  Office Visit with DUNG Wilkins CNP  [facility] [address] [place] [id number] [phone]        Sig: Take 1 tablet (150 mg) by mouth every morning    SSRIs Protocol Failed - 4/3/2019  8:59 AM       Failed - PHQ-9 score less than 5 in past 6 months    Please review last PHQ-9 score.          Passed - Medication is Bupropion    If the medication is Bupropion (Wellbutrin), and the patient is taking for smoking cessation; OK to refill.         Passed - Medication is active on med list       Passed - Patient is age 18 or older       Passed - No active pregnancy on record       Passed - No positive pregnancy test in last 12 months       Passed - Recent (6 mo) or future (30 days) visit within the authorizing provider's specialty    Patient had office visit in the last 6 months or has a visit in the next 30 days with authorizing provider or within the authorizing provider's specialty.  See \"Patient Info\" tab in inbasket, or \"Choose Columns\" in Meds & Orders section of the refill encounter.            "

## 2019-04-16 NOTE — PATIENT INSTRUCTIONS
Generally more effective to set a wake up time that you follow consistently no matter what so that you are tired at your desired bedtime.  Cut out the naps after work.  Consider a weighted blanket.  Cut back on coffee - consider a complete break for awhile  Good job with the exercise - keep it up!    Preventive Health Recommendations  Female Ages 26 - 39  Yearly exam:   See your health care provider every year in order to    Review health changes.     Discuss preventive care.      Review your medicines if you your doctor has prescribed any.    Until age 30: Get a Pap test every three years (more often if you have had an abnormal result).    After age 30: Talk to your doctor about whether you should have a Pap test every 3 years or have a Pap test with HPV screening every 5 years.   You do not need a Pap test if your uterus was removed (hysterectomy) and you have not had cancer.  You should be tested each year for STDs (sexually transmitted diseases), if you're at risk.   Talk to your provider about how often to have your cholesterol checked.  If you are at risk for diabetes, you should have a diabetes test (fasting glucose).  Shots: Get a flu shot each year. Get a tetanus shot every 10 years.   Nutrition:     Eat at least 5 servings of fruits and vegetables each day.    Eat whole-grain bread, whole-wheat pasta and brown rice instead of white grains and rice.    Get adequate Calcium and Vitamin D.     Lifestyle    Exercise at least 150 minutes a week (30 minutes a day, 5 days of the week). This will help you control your weight and prevent disease.    Limit alcohol to one drink per day.    No smoking.     Wear sunscreen to prevent skin cancer.    See your dentist every six months for an exam and cleaning.

## 2019-04-16 NOTE — PROGRESS NOTES
SUBJECTIVE:   CC: Alondra Sanchez is an 28 year old woman who presents for preventive health visit.     Healthy Habits:    Do you get at least three servings of calcium containing foods daily (dairy, green leafy vegetables, etc.)? yes    Amount of exercise or daily activities, outside of work: 2-3 day(s) per week    Problems taking medications regularly No    Medication side effects: No    Have you had an eye exam in the past two years? yes    Do you see a dentist twice per year? yes    Do you have sleep apnea, excessive snoring or daytime drowsiness? Yes    ADHD  CURRENT CONCERNS:  Stay on the short release due to long release is more expensive.      CURRENT PRESCRIPTIONS:    Adderall 5 mg daily      MEDICATION BENEFITS:  Controlled symptoms:  Attention span, Distractability and Finishing tasks  Uncontrolled symptoms:  None     MEDICATION SIDE EFFECTS:   Has:  appetite suppression and a little jittery   Denies:  none    Depression and Anxiety Follow-Up    Status since last visit: Improved by taking Prozac - both energizes and mellows out, taking prozac in the morning    Thinks sleeps affects more than anything - hard to fall asleep and is a light sleeper.  Complicated by rotating day-chanel shift work.  Not taking any sleeping medications.  Has decreased coffee intake, but still has sm-med espresso daily    Starting kickboxing classes - first class was a challenge    Started Wellbutrin on fall around august 2018 - about 2-3 weeks ago     Other associated symptoms: a little anxiety, still early.     Complicating factors:     Significant life event: No     Current substance abuse: None    PHQ 3/12/2018 8/28/2018 3/15/2019   PHQ-9 Total Score 16 15 17   Q9: Thoughts of better off dead/self-harm past 2 weeks Not at all Not at all Not at all     EMILI-7 SCORE 12/9/2016 3/12/2018 8/28/2018   Total Score - - -   Total Score - - 11 (moderate anxiety)   Total Score 13 17 11     In the past two weeks have you had thoughts of  suicide or self-harm?  No.    Do you have concerns about your personal safety or the safety of others?   No  PHQ-9  English  PHQ-9   Any Language  EMILI-7  Suicide Assessment Five-step Evaluation and Treatment (SAFE-T)    Today's PHQ-2 Score:   PHQ-2 ( 1999 Pf[employer]) 4/17/2019 3/15/2019   Q1: Little interest or pleasure in doing things 0 2   Q2: Feeling down, depressed or hopeless 0 1   PHQ-2 Score 0 3       Abuse: Current or Past(Physical, Sexual or Emotional)- No  Do you feel safe in your environment? Yes    Social History     Tobacco Use     Smoking status: Former Smoker     Types: Cigarettes     Smokeless tobacco: Never Used     Tobacco comment: occasionall 1/wk   Substance Use Topics     Alcohol use: Yes     Alcohol/week: 0.0 oz     Comment: social     If you drink alcohol do you typically have >3 drinks per day or >7 drinks per week? No                     Reviewed orders with patient.  Reviewed health maintenance and updated orders accordingly - Yes  Labs reviewed in EPIC    Mammogram not appropriate for this patient based on age.    Pertinent mammograms are reviewed under the imaging tab.  History of abnormal Pap smear: NO - age 30- 65 PAP every 3 years recommended  PAP / HPV 4/8/2016 5/22/2013   PAP NIL NIL     Reviewed and updated as needed this visit by clinical staff  Tobacco  Allergies  Meds  Med Hx  Surg Hx  Fam Hx  Soc Hx        Reviewed and updated as needed this visit by Provider          ROS:  CONSTITUTIONAL: NEGATIVE for fever, chills, change in weight  INTEGUMENTARU/SKIN: NEGATIVE for worrisome rashes, moles or lesions  EYES: NEGATIVE for vision changes or irritation  ENT: NEGATIVE for ear, mouth and throat problems  RESP: NEGATIVE for significant cough or SOB  BREAST: NEGATIVE for masses, tenderness or discharge  CV: NEGATIVE for chest pain, palpitations or peripheral edema  GI: NEGATIVE for nausea, abdominal pain, heartburn, or change in bowel habits  : NEGATIVE for unusual urinary or  vaginal symptoms. Periods are absent secondary to Mirena  MUSCULOSKELETAL: NEGATIVE for significant arthralgias or myalgia  NEURO: NEGATIVE for weakness, dizziness or paresthesias  PSYCHIATRIC: NEGATIVE for changes in mood or affect    OBJECTIVE:   /80   Pulse 74   Temp 98.1  F (36.7  C) (Oral)   Wt 77.8 kg (171 lb 9.6 oz)   SpO2 98%   BMI 27.41 kg/m    EXAM:  GENERAL: healthy, alert and no distress  EYES: Eyes grossly normal to inspection, PERRL and conjunctivae and sclerae normal  HENT: ear canals and TM's normal, nose and mouth without ulcers or lesions  NECK: no adenopathy, no asymmetry, masses, or scars and thyroid normal to palpation  RESP: lungs clear to auscultation - no rales, rhonchi or wheezes  BREAST: normal without masses, tenderness or nipple discharge and no palpable axillary masses or adenopathy  CV: regular rate and rhythm, normal S1 S2, no S3 or S4, no murmur, click or rub, no peripheral edema and peripheral pulses strong  ABDOMEN: soft, nontender, no hepatosplenomegaly, no masses and bowel sounds normal   (female): normal female external genitalia, normal urethral meatus, vaginal mucosa pink, moist, well rugated, and normal cervix/adnexa/uterus without masses or discharge  MS: no gross musculoskeletal defects noted, no edema  SKIN: no suspicious lesions or rashes  NEURO: Normal strength and tone, mentation intact and speech normal  PSYCH: mentation appears normal, affect normal/bright  LYMPH: no cervical, supraclavicular, axillary, or inguinal adenopathy    Diagnostic Test Results:  pending    ASSESSMENT/PLAN:   (Z00.01) Encounter for routine adult medical exam with abnormal findings  (primary encounter diagnosis)  Comment:   Plan:     (G47.09) Other insomnia  Comment:   Plan: Discussed sleep hygiene, altering sleep cycle, no naps, cut out caffeine, continue to increase exercise    (F33.41) Major depressive disorder, recurrent, in partial remission (H)  Comment:   Plan: OFFICE/OUTPT  "VISIT,EST,LEVL IV        Continue on prozac - no refill needed this month.    (F41.1) Generalized anxiety disorder  Comment:   Plan: OFFICE/OUTPT VISIT,EST,LEVL IV            (F90.2) Attention deficit hyperactivity disorder, combined type  Comment:   Plan: OFFICE/OUTPT VISIT,EST,LEVL IV,         amphetamine-dextroamphetamine (ADDERALL) 5 MG         tablet, amphetamine-dextroamphetamine         (ADDERALL) 5 MG tablet,         amphetamine-dextroamphetamine (ADDERALL) 5 MG         tablet, DISCONTINUED:         amphetamine-dextroamphetamine (ADDERALL XR) 5         MG 24 hr capsule, DISCONTINUED:         amphetamine-dextroamphetamine (ADDERALL XR) 5         MG 24 hr capsule, DISCONTINUED:         amphetamine-dextroamphetamine (ADDERALL XR) 5         MG 24 hr capsule            (F43.10) PTSD (post-traumatic stress disorder)  Comment:   Plan: OFFICE/OUTPT VISIT,EST,LEVL IV            (Z12.4) Screening for cervical cancer  Comment:   Plan: Pap imaged thin layer screen with HPV -         recommended age 30 - 65, HPV High Risk Types         DNA Cervical              COUNSELING:   Reviewed preventive health counseling, as reflected in patient instructions    BP Readings from Last 1 Encounters:   04/17/19 116/80     Estimated body mass index is 27.41 kg/m  as calculated from the following:    Height as of 3/15/19: 1.685 m (5' 6.34\").    Weight as of this encounter: 77.8 kg (171 lb 9.6 oz).      Weight management plan: Discussed healthy diet and exercise guidelines     reports that she has quit smoking. Her smoking use included cigarettes. She has never used smokeless tobacco.      Counseling Resources:  ATP IV Guidelines  Pooled Cohorts Equation Calculator  Breast Cancer Risk Calculator  FRAX Risk Assessment  ICSI Preventive Guidelines  Dietary Guidelines for Americans, 2010  USDA's MyPlate  ASA Prophylaxis  Lung CA Screening    DUNG Valles [facility]    "

## 2019-04-17 ENCOUNTER — OFFICE VISIT (OUTPATIENT)
Dept: FAMILY MEDICINE | Facility: CLINIC | Age: 29
End: 2019-04-17
Payer: COMMERCIAL

## 2019-04-17 VITALS
TEMPERATURE: 98.1 F | SYSTOLIC BLOOD PRESSURE: 116 MMHG | OXYGEN SATURATION: 98 % | WEIGHT: 171.6 LBS | HEART RATE: 74 BPM | BODY MASS INDEX: 27.41 KG/M2 | DIASTOLIC BLOOD PRESSURE: 80 MMHG

## 2019-04-17 DIAGNOSIS — F33.41 MAJOR DEPRESSIVE DISORDER, RECURRENT, IN PARTIAL REMISSION (H): ICD-10-CM

## 2019-04-17 DIAGNOSIS — Z12.4 SCREENING FOR CERVICAL CANCER: ICD-10-CM

## 2019-04-17 DIAGNOSIS — Z00.01 ENCOUNTER FOR ROUTINE ADULT MEDICAL EXAM WITH ABNORMAL FINDINGS: Primary | ICD-10-CM

## 2019-04-17 DIAGNOSIS — G47.09 OTHER INSOMNIA: ICD-10-CM

## 2019-04-17 DIAGNOSIS — F43.10 PTSD (POST-TRAUMATIC STRESS DISORDER): ICD-10-CM

## 2019-04-17 DIAGNOSIS — F90.2 ATTENTION DEFICIT HYPERACTIVITY DISORDER, COMBINED TYPE: ICD-10-CM

## 2019-04-17 DIAGNOSIS — F41.1 GENERALIZED ANXIETY DISORDER: ICD-10-CM

## 2019-04-17 PROCEDURE — G0145 SCR C/V CYTO,THINLAYER,RESCR: HCPCS | Performed by: NURSE PRACTITIONER

## 2019-04-17 PROCEDURE — 99395 PREV VISIT EST AGE 18-39: CPT | Performed by: NURSE PRACTITIONER

## 2019-04-17 PROCEDURE — 99214 OFFICE O/P EST MOD 30 MIN: CPT | Mod: 25 | Performed by: NURSE PRACTITIONER

## 2019-04-17 PROCEDURE — 87624 HPV HI-RISK TYP POOLED RSLT: CPT | Performed by: NURSE PRACTITIONER

## 2019-04-17 RX ORDER — DEXTROAMPHETAMINE SACCHARATE, AMPHETAMINE ASPARTATE, DEXTROAMPHETAMINE SULFATE AND AMPHETAMINE SULFATE 1.25; 1.25; 1.25; 1.25 MG/1; MG/1; MG/1; MG/1
5 TABLET ORAL 2 TIMES DAILY
Qty: 60 TABLET | Refills: 0 | Status: SHIPPED | OUTPATIENT
Start: 2019-05-18 | End: 2019-06-17

## 2019-04-17 RX ORDER — DEXTROAMPHETAMINE SACCHARATE, AMPHETAMINE ASPARTATE MONOHYDRATE, DEXTROAMPHETAMINE SULFATE AND AMPHETAMINE SULFATE 1.25; 1.25; 1.25; 1.25 MG/1; MG/1; MG/1; MG/1
5 CAPSULE, EXTENDED RELEASE ORAL DAILY
Qty: 30 CAPSULE | Refills: 0 | Status: SHIPPED | OUTPATIENT
Start: 2019-06-18 | End: 2019-04-17

## 2019-04-17 RX ORDER — DEXTROAMPHETAMINE SACCHARATE, AMPHETAMINE ASPARTATE MONOHYDRATE, DEXTROAMPHETAMINE SULFATE AND AMPHETAMINE SULFATE 1.25; 1.25; 1.25; 1.25 MG/1; MG/1; MG/1; MG/1
5 CAPSULE, EXTENDED RELEASE ORAL DAILY
Qty: 30 CAPSULE | Refills: 0 | Status: SHIPPED | OUTPATIENT
Start: 2019-04-17 | End: 2019-04-17

## 2019-04-17 RX ORDER — DEXTROAMPHETAMINE SACCHARATE, AMPHETAMINE ASPARTATE MONOHYDRATE, DEXTROAMPHETAMINE SULFATE AND AMPHETAMINE SULFATE 1.25; 1.25; 1.25; 1.25 MG/1; MG/1; MG/1; MG/1
5 CAPSULE, EXTENDED RELEASE ORAL DAILY
Qty: 30 CAPSULE | Refills: 0 | Status: SHIPPED | OUTPATIENT
Start: 2019-05-18 | End: 2019-04-17

## 2019-04-17 RX ORDER — DEXTROAMPHETAMINE SACCHARATE, AMPHETAMINE ASPARTATE, DEXTROAMPHETAMINE SULFATE AND AMPHETAMINE SULFATE 1.25; 1.25; 1.25; 1.25 MG/1; MG/1; MG/1; MG/1
5 TABLET ORAL 2 TIMES DAILY
Qty: 60 TABLET | Refills: 0 | Status: SHIPPED | OUTPATIENT
Start: 2019-04-17 | End: 2019-05-17

## 2019-04-17 RX ORDER — BUPROPION HYDROCHLORIDE 150 MG/1
150 TABLET ORAL EVERY MORNING
Qty: 30 TABLET | Refills: 0 | Status: CANCELLED | OUTPATIENT
Start: 2019-04-17

## 2019-04-17 RX ORDER — DEXTROAMPHETAMINE SACCHARATE, AMPHETAMINE ASPARTATE, DEXTROAMPHETAMINE SULFATE AND AMPHETAMINE SULFATE 1.25; 1.25; 1.25; 1.25 MG/1; MG/1; MG/1; MG/1
5 TABLET ORAL 2 TIMES DAILY
Qty: 60 TABLET | Refills: 0 | Status: SHIPPED | OUTPATIENT
Start: 2019-06-18 | End: 2019-07-18

## 2019-04-17 NOTE — LETTER
April 26, 2019    Alondra Hutchinsondariana  [address]   [place] [id number]    Dear ,  This letter is regarding your recent Pap smear (cervical cancer screening) and Human Papillomavirus (HPV) test.  We are happy to inform you that your Pap smear result is normal. Cervical cancer is closely linked with certain types of HPV. Your results showed no evidence of high-risk HPV.  Therefore we recommend you return in 3 years for your next pap smear.  You will still need to return to the clinic every year for an annual exam and other preventive tests.  If you have additional questions regarding this result, please call our registered nurse, Pati at [phone].  Sincerely,    Mirtha Herrera, DUNG CNP/esh

## 2019-04-23 LAB
COPATH REPORT: NORMAL
PAP: NORMAL

## 2019-04-24 LAB
FINAL DIAGNOSIS: NORMAL
HPV HR 12 DNA CVX QL NAA+PROBE: NEGATIVE
HPV16 DNA SPEC QL NAA+PROBE: NEGATIVE
HPV18 DNA SPEC QL NAA+PROBE: NEGATIVE
SPECIMEN DESCRIPTION: NORMAL
SPECIMEN SOURCE CVX/VAG CYTO: NORMAL

## 2019-05-23 DIAGNOSIS — F41.1 GENERALIZED ANXIETY DISORDER: ICD-10-CM

## 2019-05-23 NOTE — TELEPHONE ENCOUNTER
"Requested Prescriptions   Pending Prescriptions Disp Refills     FLUoxetine (PROZAC) 20 MG capsule 30 capsule 1     Sig: Take 1 capsule (20 mg) by mouth daily  Last Written Prescription Date:  03/28/2019  Last Fill Quantity: 30,  # refills: 1   Last office visit: 4/17/2019 with prescribing provider:  04/17/2019   Future Office Visit:         SSRIs Protocol Passed - 5/23/2019  9:03 AM        Passed - Recent (12 mo) or future (30 days) visit within the authorizing provider's specialty     Patient had office visit in the last 12 months or has a visit in the next 30 days with authorizing provider or within the authorizing provider's specialty.  See \"Patient Info\" tab in inbasket, or \"Choose Columns\" in Meds & Orders section of the refill encounter.              Passed - Medication is active on med list        Passed - Patient is age 18 or older        Passed - No active pregnancy on record        Passed - No positive pregnancy test in last 12 months        "

## 2019-05-23 NOTE — TELEPHONE ENCOUNTER
Charlene,  Please review/sign or advise for refill request of: FLUoxetine (PROZAC) 20 MG capsule    Routing refill request to provider for review/approval because:  Medication has not been prescribed by provider in the clinic. Last prescribed by Stephany Magaña [facility]    Thank You!  Karli Rendon RN  Triage Nurse

## 2019-08-26 ENCOUNTER — OFFICE VISIT (OUTPATIENT)
Dept: MIDWIFE SERVICES | Facility: CLINIC | Age: 29
End: 2019-08-26
Payer: COMMERCIAL

## 2019-08-26 DIAGNOSIS — N76.0 BV (BACTERIAL VAGINOSIS): ICD-10-CM

## 2019-08-26 DIAGNOSIS — N30.00 ACUTE CYSTITIS WITHOUT HEMATURIA: ICD-10-CM

## 2019-08-26 DIAGNOSIS — B96.89 BV (BACTERIAL VAGINOSIS): ICD-10-CM

## 2019-08-26 DIAGNOSIS — B37.31 YEAST INFECTION OF THE VAGINA: ICD-10-CM

## 2019-08-26 DIAGNOSIS — N89.8 VAGINAL DISCHARGE: ICD-10-CM

## 2019-08-26 DIAGNOSIS — R30.0 DYSURIA: Primary | ICD-10-CM

## 2019-08-26 LAB
ALBUMIN UR-MCNC: NEGATIVE MG/DL
APPEARANCE UR: CLEAR
BACTERIA #/AREA URNS HPF: ABNORMAL /HPF
BILIRUB UR QL STRIP: NEGATIVE
COLOR UR AUTO: YELLOW
GLUCOSE UR STRIP-MCNC: NEGATIVE MG/DL
HGB UR QL STRIP: ABNORMAL
KETONES UR STRIP-MCNC: NEGATIVE MG/DL
LEUKOCYTE ESTERASE UR QL STRIP: ABNORMAL
NITRATE UR QL: NEGATIVE
NON-SQ EPI CELLS #/AREA URNS LPF: ABNORMAL /LPF
PH UR STRIP: 6.5 PH (ref 5–7)
RBC #/AREA URNS AUTO: ABNORMAL /HPF
SOURCE: ABNORMAL
SP GR UR STRIP: <=1.005 (ref 1–1.03)
SPECIMEN SOURCE: ABNORMAL
UROBILINOGEN UR STRIP-ACNC: 0.2 EU/DL (ref 0.2–1)
WBC #/AREA URNS AUTO: ABNORMAL /HPF
WET PREP SPEC: ABNORMAL

## 2019-08-26 PROCEDURE — 81001 URINALYSIS AUTO W/SCOPE: CPT | Performed by: ADVANCED PRACTICE MIDWIFE

## 2019-08-26 PROCEDURE — 87086 URINE CULTURE/COLONY COUNT: CPT | Performed by: ADVANCED PRACTICE MIDWIFE

## 2019-08-26 PROCEDURE — 87210 SMEAR WET MOUNT SALINE/INK: CPT | Performed by: ADVANCED PRACTICE MIDWIFE

## 2019-08-26 PROCEDURE — 99213 OFFICE O/P EST LOW 20 MIN: CPT | Performed by: ADVANCED PRACTICE MIDWIFE

## 2019-08-26 RX ORDER — CEPHALEXIN 500 MG/1
500 CAPSULE ORAL 2 TIMES DAILY
Qty: 14 CAPSULE | Refills: 0 | Status: SHIPPED | OUTPATIENT
Start: 2019-08-26 | End: 2019-09-02

## 2019-08-26 RX ORDER — FLUCONAZOLE 150 MG/1
150 TABLET ORAL
Qty: 3 TABLET | Refills: 0 | Status: SHIPPED | OUTPATIENT
Start: 2019-08-26 | End: 2019-09-04

## 2019-08-26 RX ORDER — METRONIDAZOLE 500 MG/1
500 TABLET ORAL 2 TIMES DAILY
Qty: 14 TABLET | Refills: 0 | Status: SHIPPED | OUTPATIENT
Start: 2019-08-26 | End: 2019-09-02

## 2019-08-26 NOTE — PROGRESS NOTES
SUBJECTIVE:  Alondra Sanchez is a 28 year old female presents with local irritation and vulvar itching for 5 days. She is also experiencing burning with urination and last night she had some right -sided flank pain which is now resolved.    Contraception IUD mirena    REVIEW OF SYSTEMS  C: NEGATIVE for fever, chills, change in weight  R: NEGATIVE for significant cough or SOB  CV: NEGATIVE for chest pain, palpitations or peripheral edema  GI: NEGATIVE for nausea, abdominal pain, heartburn, or change in bowel habits  : NEGATIVE for frequency, dysuria, or hematuria      General medical, surgical, OB/Gyn and social histories   reviewed and updated in Histories section of [facility].     OBJECTIVE:   EXAM  There were no vitals taken for this visit.  Patient appears well.    Abdomen normal, soft without tenderness, guarding, mass or organomegaly.   No inguinal adenopathy or CVA tenderness.on exam today    PELVIC EXAM:  PELVIC EXAM:  Vulva: BUS WNL, no lesions noted,   Vagina: Discharge scant and white, no lesions, well rugated, good tone,   Cervix: Smooth, pink, no visible lesions, neg CMT,   Uterus: Normal size and position, non-tender, mobile,   Ovaries: No masses palpable, non-tender, mobile,   Rectal exam: deferred    WET PREP: positive for clue cells and yeast   GC/CHLAMYDIA CULTURE OBTAINED:PATIENT DECLINED  UA:Suspicious for UTI    ASSESSMENT:   yeast, bacterial vaginosis.  (R30.0) Dysuria  (primary encounter diagnosis)  Comment:   Plan: UA reflex to Microscopic, Urine Microscopic,         Urine Culture Aerobic Bacterial, CANCELED: *UA         reflex to Microscopic and Culture ([place];         [facility]; [facility]; [facility];         [facility]; [facility]; [place];         [place])            (N89.8) Vaginal discharge  Comment:   Plan: Wet prep            (B37.3) Yeast infection of the vagina  Comment:   Plan: fluconazole (DIFLUCAN) 150 MG tablet            (N76.0,  B96.89) BV (bacterial  vaginosis)  Comment:   Plan: metroNIDAZOLE (FLAGYL) 500 MG tablet            (N30.00) Acute cystitis without hematuria  Comment:   Plan: cephALEXin (KEFLEX) 500 MG capsule              PLAN:  Treatment plan per orders in [facility].   STD prevention discussed. Birth control needs reviewed.  Abstain from intercourse for duration of treatment.   Return if symptoms do not resolve as anticipated.  Given letter/ handout on healthy vaginal environment.      Lizzy Ignacio, SALLYM, APRN CNM

## 2019-08-27 LAB
BACTERIA SPEC CULT: NORMAL
SPECIMEN SOURCE: NORMAL

## 2019-11-18 DIAGNOSIS — F41.1 GENERALIZED ANXIETY DISORDER: ICD-10-CM

## 2019-11-18 NOTE — TELEPHONE ENCOUNTER
"Requested Prescriptions   Pending Prescriptions Disp Refills     FLUoxetine (PROZAC) 20 MG capsule 90 capsule 1     Sig: Take 1 capsule (20 mg) by mouth daily  Last Written Prescription Date:  05/23/2019  Last Fill Quantity: 90,  # refills: 1   Last office visit: 4/17/2019 with prescribing provider:  04/17/2019   Future Office Visit:         SSRIs Protocol Passed - 11/18/2019  9:42 AM        Passed - Recent (12 mo) or future (30 days) visit within the authorizing provider's specialty     Patient has had an office visit with the authorizing provider or a provider within the authorizing providers department within the previous 12 mos or has a future within next 30 days. See \"Patient Info\" tab in inbasket, or \"Choose Columns\" in Meds & Orders section of the refill encounter.              Passed - Medication is active on med list        Passed - Patient is age 18 or older        Passed - No active pregnancy on record        Passed - No positive pregnancy test in last 12 months        "

## 2019-11-18 NOTE — TELEPHONE ENCOUNTER
Prescription approved per [facility] Refill Protocol.  Lynn Hill RN on 11/18/2019 at 11:31 AM

## 2019-12-17 ENCOUNTER — OFFICE VISIT (OUTPATIENT)
Dept: MIDWIFE SERVICES | Facility: CLINIC | Age: 29
End: 2019-12-17
Payer: COMMERCIAL

## 2019-12-17 VITALS
HEART RATE: 73 BPM | DIASTOLIC BLOOD PRESSURE: 73 MMHG | BODY MASS INDEX: 28.12 KG/M2 | WEIGHT: 176 LBS | SYSTOLIC BLOOD PRESSURE: 110 MMHG

## 2019-12-17 DIAGNOSIS — N89.8 VAGINAL DISCHARGE: Primary | ICD-10-CM

## 2019-12-17 DIAGNOSIS — R30.0 DYSURIA: ICD-10-CM

## 2019-12-17 DIAGNOSIS — F43.23 ADJUSTMENT DISORDER WITH MIXED ANXIETY AND DEPRESSED MOOD: ICD-10-CM

## 2019-12-17 LAB
ALBUMIN UR-MCNC: NEGATIVE MG/DL
APPEARANCE UR: CLEAR
BILIRUB UR QL STRIP: NEGATIVE
COLOR UR AUTO: YELLOW
GLUCOSE UR STRIP-MCNC: NEGATIVE MG/DL
HGB UR QL STRIP: NEGATIVE
KETONES UR STRIP-MCNC: NEGATIVE MG/DL
LEUKOCYTE ESTERASE UR QL STRIP: NEGATIVE
NITRATE UR QL: NEGATIVE
PH UR STRIP: 7 PH (ref 5–7)
SOURCE: NORMAL
SP GR UR STRIP: 1.02 (ref 1–1.03)
SPECIMEN SOURCE: NORMAL
UROBILINOGEN UR STRIP-ACNC: 0.2 EU/DL (ref 0.2–1)
WET PREP SPEC: NORMAL

## 2019-12-17 PROCEDURE — 81003 URINALYSIS AUTO W/O SCOPE: CPT | Performed by: ADVANCED PRACTICE MIDWIFE

## 2019-12-17 PROCEDURE — 99213 OFFICE O/P EST LOW 20 MIN: CPT | Performed by: ADVANCED PRACTICE MIDWIFE

## 2019-12-17 PROCEDURE — 87210 SMEAR WET MOUNT SALINE/INK: CPT | Performed by: ADVANCED PRACTICE MIDWIFE

## 2019-12-17 RX ORDER — FLUOXETINE 10 MG/1
30 CAPSULE ORAL DAILY
Qty: 270 CAPSULE | Refills: 3 | Status: SHIPPED | OUTPATIENT
Start: 2019-12-17 | End: 2021-09-28

## 2019-12-17 NOTE — NURSING NOTE
"Chief Complaint   Patient presents with     Consult       Initial /73   Pulse 73   Wt 79.8 kg (176 lb)   BMI 28.12 kg/m   Estimated body mass index is 28.12 kg/m  as calculated from the following:    Height as of 3/15/19: 1.685 m (5' 6.34\").    Weight as of this encounter: 79.8 kg (176 lb).  BP completed using cuff size: regular    Questioned patient about current smoking habits.  Pt. has never smoked.          The following HM Due: NONE      The following patient reported/Care Every where data was sent to:  P ABSTRACT QUALITY INITIATIVES [[id number]]        N/a      April Rogers MA                "

## 2020-08-24 ENCOUNTER — RECORDS - HEALTHEAST (OUTPATIENT)
Dept: LAB | Facility: CLINIC | Age: 30
End: 2020-08-24

## 2020-08-26 LAB
GAMMA INTERFERON BACKGROUND BLD IA-ACNC: 0.08 IU/ML
M TB IFN-G BLD-IMP: NEGATIVE
MITOGEN IGNF BCKGRD COR BLD-ACNC: -0.01 IU/ML
MITOGEN IGNF BCKGRD COR BLD-ACNC: -0.01 IU/ML
QTF INTERPRETATION: NORMAL
QTF MITOGEN - NIL: 5.29 IU/ML

## 2020-12-14 ENCOUNTER — HEALTH MAINTENANCE LETTER (OUTPATIENT)
Age: 30
End: 2020-12-14

## 2020-12-15 ENCOUNTER — OFFICE VISIT (OUTPATIENT)
Dept: MIDWIFE SERVICES | Facility: CLINIC | Age: 30
End: 2020-12-15
Payer: COMMERCIAL

## 2020-12-15 DIAGNOSIS — Z00.00 ANNUAL PHYSICAL EXAM: Primary | ICD-10-CM

## 2020-12-15 DIAGNOSIS — F33.41 MAJOR DEPRESSIVE DISORDER, RECURRENT, IN PARTIAL REMISSION (H): ICD-10-CM

## 2020-12-15 DIAGNOSIS — Z80.3 FAMILY HISTORY OF MALIGNANT NEOPLASM OF BREAST: ICD-10-CM

## 2020-12-15 PROBLEM — N39.0 FREQUENT UTI: Status: ACTIVE | Noted: 2020-12-15

## 2020-12-15 PROCEDURE — 99395 PREV VISIT EST AGE 18-39: CPT | Performed by: ADVANCED PRACTICE MIDWIFE

## 2020-12-15 PROCEDURE — 99213 OFFICE O/P EST LOW 20 MIN: CPT | Mod: 25 | Performed by: ADVANCED PRACTICE MIDWIFE

## 2020-12-15 RX ORDER — FLUOXETINE 40 MG/1
40 CAPSULE ORAL DAILY
Qty: 90 CAPSULE | Refills: 3 | Status: SHIPPED | OUTPATIENT
Start: 2020-12-15 | End: 2020-12-15

## 2020-12-15 RX ORDER — FLUOXETINE 40 MG/1
40 CAPSULE ORAL DAILY
Qty: 90 CAPSULE | Refills: 3 | Status: SHIPPED | OUTPATIENT
Start: 2020-12-15 | End: 2022-02-16

## 2020-12-15 SDOH — HEALTH STABILITY: MENTAL HEALTH: HOW OFTEN DO YOU HAVE 6 OR MORE DRINKS ON ONE OCCASION?: NEVER

## 2020-12-15 SDOH — HEALTH STABILITY: MENTAL HEALTH: HOW MANY STANDARD DRINKS CONTAINING ALCOHOL DO YOU HAVE ON A TYPICAL DAY?: 1 OR 2

## 2020-12-15 SDOH — HEALTH STABILITY: MENTAL HEALTH: HOW OFTEN DO YOU HAVE A DRINK CONTAINING ALCOHOL?: 4 OR MORE TIMES A WEEK

## 2020-12-15 ASSESSMENT — PATIENT HEALTH QUESTIONNAIRE - PHQ9
5. POOR APPETITE OR OVEREATING: SEVERAL DAYS
SUM OF ALL RESPONSES TO PHQ QUESTIONS 1-9: 14

## 2020-12-15 ASSESSMENT — ANXIETY QUESTIONNAIRES
GAD7 TOTAL SCORE: 15
5. BEING SO RESTLESS THAT IT IS HARD TO SIT STILL: NEARLY EVERY DAY
6. BECOMING EASILY ANNOYED OR IRRITABLE: SEVERAL DAYS
1. FEELING NERVOUS, ANXIOUS, OR ON EDGE: MORE THAN HALF THE DAYS
3. WORRYING TOO MUCH ABOUT DIFFERENT THINGS: NEARLY EVERY DAY
7. FEELING AFRAID AS IF SOMETHING AWFUL MIGHT HAPPEN: NEARLY EVERY DAY
2. NOT BEING ABLE TO STOP OR CONTROL WORRYING: MORE THAN HALF THE DAYS

## 2020-12-15 NOTE — PROGRESS NOTES
Alondra is a 29 year old  female who presents for annual exam. Alondra is overall doing well but has had a hard year emotionally due to COVID-19. 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 She has limited her personal and social life due to her work as well. Alondra is taking Prozac for depression and this is working well, although she feels like she would like to increase dose slightly as she has noticed shift in mental health this year. Rx increased to 40mg and sent to pharmacy. Alondra also takes Adderall. This CNM does not prescribe Adderall, so encouraged to follow up with initial prescriber, Mirtha Herrera CNP. Alondra is also hoping to initiate talk therapy. Discussed how to find provider using [employer] website.    Menses are rare.  No LMP recorded. (Menstrual status: IUD). Using Mirena IUD for contraception. Inserted 2016; due for replacement 2022. She is not currently considering pregnancy.  Besides routine health maintenance,  she would like to discuss refill.  GYNECOLOGIC HISTORY:  Menarche:   Alondra is sexually active with 1 male partner(s) and is currently in monogamous relationship.    History sexually transmitted infections: No STD history  STI testing offered?  Declined  SOURAV exposure: Unknown  History of abnormal Pap smear: NO - age 30-65 PAP every 5 years with negative HPV co-testing recommended  Family history of breast CA: Yes (Please explain): mom passed away from breast cancer in her 40s; she refused treatment and lived for 8 years with cancer. Discussed that Alondra could consider testing for BRCA mutation. She would like to speak with genetic counselor to further this discussion if it is covered by her insurance. Breast Center Referral placed.   Family history of uterine/ovarian CA: No    Family history of colon CA: No    HEALTH MAINTENANCE:  Cholesterol: (No results  found for: CHOL History of abnormal lipids: na  Mammo: na . History of abnormal Mammo: Not applicable.  Regular Self Breast Exams: sometime  Calcium/Vitamin D intake: source:  dairy, vit d and daily vit Adequate? Yes  TSH: (  TSH   Date Value Ref Range Status   2016 0.98 0.40 - 4.00 mU/L Final    )  Pap; (  Lab Results   Component Value Date    PAP NIL 2019    PAP NIL 2016    PAP NIL 2013    )    HISTORY:  OB History    Para Term  AB Living   0 0 0 0 0 0   SAB TAB Ectopic Multiple Live Births   0 0 0 0 0     Past Medical History:   Diagnosis Date     Adjustment disorder with anxiety 7/3/2012     Generalized anxiety disorder      Past Surgical History:   Procedure Laterality Date     EXCISE MASS LOWER EXTREMITY  3/9/2012    Procedure:EXCISE MASS LOWER EXTREMITY; Excision Right Lateral Knee Mass; Surgeon:RUTHY MIGUEL; Location:[facility] OR     ORTHOPEDIC SURGERY       Family History   Problem Relation Age of Onset     Cancer Mother         terminal cancer --- breast cancer move into her bones     Other - See Comments Mother         Sleep apnea     Anxiety Disorder Mother      Mental Illness Mother      Schizophrenia Mother      Thyroid Disease Paternal Grandmother      Other - See Comments Paternal Grandmother         Sleep apnea     Uterine Cancer Paternal Grandmother      Other - See Comments Father         Sleep apnea     Mental Illness Brother      Cancer Maternal Grandmother      Diabetes Maternal Grandmother      Colon Cancer Maternal Grandmother      Glaucoma Maternal Grandfather      Other - See Comments Maternal Grandfather         Sleep apnea     Mental Illness Other      Schizophrenia Maternal Uncle      Hypertension No family hx of      Cerebrovascular Disease No family hx of      Macular Degeneration No family hx of      Social History     Socioeconomic History     Marital status:      Spouse name: Not on file     Number of children: Not on file     Years of  education: Not on file     Highest education level: Not on file   Occupational History     Not on file   Social Needs     Financial resource strain: Not on file     Food insecurity     Worry: Not on file     Inability: Not on file     Transportation needs     Medical: Not on file     Non-medical: Not on file   Tobacco Use     Smoking status: Former Smoker     Types: Cigarettes     Smokeless tobacco: Never Used     Tobacco comment: occasionall 1/wk   Substance and Sexual Activity     Alcohol use: Yes     Alcohol/week: 0.0 standard drinks     Comment: social     Drug use: No     Sexual activity: Yes     Partners: Male     Birth control/protection: I.U.D.   Lifestyle     Physical activity     Days per week: Not on file     Minutes per session: Not on file     Stress: Not on file   Relationships     Social connections     Talks on phone: Not on file     Gets together: Not on file     Attends Yarsanism service: Not on file     Active member of club or organization: Not on file     Attends meetings of clubs or organizations: Not on file     Relationship status: Not on file     Intimate partner violence     Fear of current or ex partner: Not on file     Emotionally abused: Not on file     Physically abused: Not on file     Forced sexual activity: Not on file   Other Topics Concern     Parent/sibling w/ CABG, MI or angioplasty before 65F 55M? 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       Current Outpatient Medications:      amphetamine-dextroamphetamine (ADDERALL) 5 MG tablet, Take 1 tablet (5 mg) by mouth 2 times daily, Disp: 60 tablet, Rfl: 0     Cholecalciferol (VITAMIN D PO), Take 5,000 Int'l Units by mouth , Disp: , Rfl:      FLUoxetine (PROZAC) 10 MG capsule, Take 3 capsules (30 mg) by mouth daily, Disp: 270 capsule, Rfl: 3     FLUoxetine (PROZAC) 20 MG capsule, Take 1 capsule (20 mg) by mouth daily, Disp: 90 capsule, Rfl: 1     Multiple Vitamin  (MULTIVITAMIN) per tablet, Take 1 tablet by mouth daily., Disp: , Rfl:      Omega-3 Fatty Acids (FISH OIL) 500 MG CAPS, Take 2 capsules by mouth daily Reported on 5/3/2017, Disp: , Rfl:      Probiotic Product (SOLUBLE FIBER/PROBIOTICS PO), , Disp: , Rfl:    No Known Allergies    Past medical, surgical, social and family history were reviewed and updated in EPIC.    ROS:   C:     NEGATIVE for fever, chills, change in weight  I:       NEGATIVE for worrisome rashes, moles or lesions  E:     NEGATIVE for vision changes or irritation  E/M: NEGATIVE for ear, mouth and throat problems  R:     NEGATIVE for significant cough or SOB  CV:   NEGATIVE for chest pain, palpitations or peripheral edema  GI:     NEGATIVE for nausea, abdominal pain, heartburn, or change in bowel habits  :   NEGATIVE for frequency, dysuria, hematuria, vaginal discharge, or irregular bleeding  M:     NEGATIVE for significant arthralgias or myalgia  N:      NEGATIVE for weakness, dizziness or paresthesias  E:      NEGATIVE for temperature intolerance, skin/hair changes  P:      NEGATIVE for changes in mood or affect.    EXAM:  There were no vitals taken for this visit.   BMI: There is no height or weight on file to calculate BMI.  Constitutional: healthy, alert and no distress  Head: Normocephalic. No masses, lesions, tenderness or abnormalities  Neck: Neck supple. Trachea midline. No adenopathy. Thyroid symmetric, normal size.   Cardiovascular: RRR.   Respiratory: Negative.   Breast: Breasts reveal mild symmetric fibrocystic densities, but there are no dominant, discrete, fixed or suspicious masses found.  Gastrointestinal: Abdomen soft, non-tender, non-distended. No masses, organomegaly.  Pelvic exam: deffered  Musculoskeletal: extremities normal  Skin: no suspicious lesions or rashes  Psychiatric: Affect appropriate, cooperative,mentation appears normal.     COUNSELING:   Reviewed preventive health counseling, as reflected in patient  instructions  Special attention given to:        Regular exercise       Healthy diet/nutrition       Mental Health   reports that she has quit smoking. Her smoking use included cigarettes. She has never used smokeless tobacco.    There is no height or weight on file to calculate BMI.        ASSESSMENT:  29 year old female with satisfactory annual exam  (Z80.3) Family history of malignant neoplasm of breast  (primary encounter diagnosis)  Plan: BREAST CENTER REFERRAL      (F33.41) Major depressive disorder, recurrent, in partial remission (H)  Plan: FLUoxetine (PROZAC) 40 MG capsule      (Z00.00) Annual physical exam      DUNG DominguezM

## 2020-12-16 ASSESSMENT — ANXIETY QUESTIONNAIRES: GAD7 TOTAL SCORE: 15

## 2021-01-02 ENCOUNTER — RECORDS - HEALTHEAST (OUTPATIENT)
Dept: LAB | Facility: CLINIC | Age: 31
End: 2021-01-02

## 2021-01-02 LAB
SARS-COV-2 PCR COMMENT: NORMAL
SARS-COV-2 RNA SPEC QL NAA+PROBE: NEGATIVE
SARS-COV-2 VIRUS SPECIMEN SOURCE: NORMAL

## 2021-01-27 DIAGNOSIS — F43.23 ADJUSTMENT DISORDER WITH MIXED ANXIETY AND DEPRESSED MOOD: ICD-10-CM

## 2021-01-27 RX ORDER — FLUOXETINE 10 MG/1
30 CAPSULE ORAL DAILY
Qty: 270 CAPSULE | Refills: 1 | OUTPATIENT
Start: 2021-01-27

## 2021-01-27 NOTE — TELEPHONE ENCOUNTER
Last OV was 12/15/2020.     PHQ 8/28/2018 3/15/2019 12/15/2020   PHQ-9 Total Score 15 17 14   Q9: Thoughts of better off dead/self-harm past 2 weeks Not at all Not at all Not at all     EMILI-7 SCORE 3/12/2018 8/28/2018 12/15/2020   Total Score - - -   Total Score - 11 (moderate anxiety) -   Total Score 17 11 15       Prescription was sent on 12/15/20 for 40mg. This request is for 30mg. Declining request.   Dina Barnes, RN-BSN

## 2021-09-28 ENCOUNTER — OFFICE VISIT (OUTPATIENT)
Dept: MIDWIFE SERVICES | Facility: CLINIC | Age: 31
End: 2021-09-28
Payer: COMMERCIAL

## 2021-09-28 VITALS
BODY MASS INDEX: 30.81 KG/M2 | WEIGHT: 184.9 LBS | SYSTOLIC BLOOD PRESSURE: 111 MMHG | HEART RATE: 72 BPM | HEIGHT: 65 IN | DIASTOLIC BLOOD PRESSURE: 81 MMHG

## 2021-09-28 DIAGNOSIS — N93.0 BLEEDING AFTER INTERCOURSE: ICD-10-CM

## 2021-09-28 DIAGNOSIS — Z11.3 SCREENING FOR GONORRHEA: ICD-10-CM

## 2021-09-28 DIAGNOSIS — Z97.5 IUD (INTRAUTERINE DEVICE) IN PLACE: ICD-10-CM

## 2021-09-28 DIAGNOSIS — Z11.8 SPECIAL SCREENING EXAMINATION FOR CHLAMYDIAL DISEASE: ICD-10-CM

## 2021-09-28 DIAGNOSIS — Z00.00 ANNUAL PHYSICAL EXAM: Primary | ICD-10-CM

## 2021-09-28 DIAGNOSIS — Z12.4 SCREENING FOR MALIGNANT NEOPLASM OF CERVIX: ICD-10-CM

## 2021-09-28 LAB
CLUE CELLS: ABNORMAL
TRICHOMONAS, WET PREP: ABNORMAL
WBC'S/HIGH POWER FIELD, WET PREP: ABNORMAL
YEAST, WET PREP: ABNORMAL

## 2021-09-28 PROCEDURE — 87591 N.GONORRHOEAE DNA AMP PROB: CPT | Performed by: ADVANCED PRACTICE MIDWIFE

## 2021-09-28 PROCEDURE — 99395 PREV VISIT EST AGE 18-39: CPT | Performed by: ADVANCED PRACTICE MIDWIFE

## 2021-09-28 PROCEDURE — G0145 SCR C/V CYTO,THINLAYER,RESCR: HCPCS | Performed by: ADVANCED PRACTICE MIDWIFE

## 2021-09-28 PROCEDURE — 99213 OFFICE O/P EST LOW 20 MIN: CPT | Mod: 25 | Performed by: ADVANCED PRACTICE MIDWIFE

## 2021-09-28 PROCEDURE — 87624 HPV HI-RISK TYP POOLED RSLT: CPT | Performed by: ADVANCED PRACTICE MIDWIFE

## 2021-09-28 PROCEDURE — 87491 CHLMYD TRACH DNA AMP PROBE: CPT | Performed by: ADVANCED PRACTICE MIDWIFE

## 2021-09-28 PROCEDURE — 87210 SMEAR WET MOUNT SALINE/INK: CPT | Performed by: ADVANCED PRACTICE MIDWIFE

## 2021-09-28 ASSESSMENT — MIFFLIN-ST. JEOR: SCORE: 1549.58

## 2021-09-28 NOTE — PROGRESS NOTES
"Alondra is a 30 year old  female who presents for annual exam. She has a Mirena IUD in place since 2016. She has experienced bleeding with IC for the last few months. No pain, but notices \"watery blood\". She also has noticed a vaginal odor.      No LMP recorded. (Menstrual status: IUD).. Using IUD for contraception.  She is not currently considering pregnancy.  Besides routine health maintenance,  she would like to discuss bleeding after intercourse.  GYNECOLOGIC HISTORY:  Menarche: [age]    Alondra is sexually active with male partner(s) and is currently in monogamous relationship with .    History sexually transmitted infections:No STD history  STI testing offered?  Declined  SOURAV exposure: No  History of abnormal Pap smear: NO - age 30- 65 PAP every 3 years recommended  Family history of breast CA: Yes (Please explain): mother  Family history of uterine/ovarian CA: No    Family history of colon CA: No    HEALTH MAINTENANCE:  Cholesterol: (No results found for: CHOL History of abnormal lipids: No  Mammo: n/a . History of abnormal Mammo: Not applicable.  Regular Self Breast Exams: No  Calcium/Vitamin D intake: source:  dietary supplement(s) Adequate? Yes  TSH: (  TSH   Date Value Ref Range Status   2016 0.98 0.40 - 4.00 mU/L Final    )  Pap; (  Lab Results   Component Value Date    PAP NIL 2019    PAP NIL 2016    PAP NIL 2013    )    HISTORY:  OB History    Para Term  AB Living   0 0 0 0 0 0   SAB TAB Ectopic Multiple Live Births   0 0 0 0 0     Past Medical History:   Diagnosis Date     Adjustment disorder with anxiety 7/3/2012     Generalized anxiety disorder      Insomnia 7/3/2012     Past Surgical History:   Procedure Laterality Date     EXCISE MASS LOWER EXTREMITY  3/9/2012    Procedure:EXCISE MASS LOWER EXTREMITY; Excision Right Lateral Knee Mass; Surgeon:RUTHY MIGUEL; Location:[facility]     ORTHOPEDIC SURGERY       Family History   Problem Relation " Age of Onset     Other - See Comments Mother         Sleep apnea     Anxiety Disorder Mother      Mental Illness Mother      Schizophrenia Mother      Breast Cancer Mother 45        terminal cancer --- breast cancer move into her bones     Sleep Apnea Mother      Thyroid Disease Paternal Grandmother      Other - See Comments Paternal Grandmother         Sleep apnea     Uterine Cancer Paternal Grandmother 80     Other - See Comments Father         Sleep apnea     Mental Illness Brother      Cancer Maternal Grandmother      Diabetes Maternal Grandmother      Colon Cancer Maternal Grandmother 70     Glaucoma Maternal Grandfather      Other - See Comments Maternal Grandfather         Sleep apnea     Mental Illness Other      Schizophrenia Maternal Uncle      Hypertension No family hx of      Cerebrovascular Disease No family hx of      Macular Degeneration No family hx of      Social History     Socioeconomic History     Marital status:      Spouse name: None     Number of children: None     Years of education: None     Highest education level: None   Occupational History     None   Tobacco Use     Smoking status: Former Smoker     Types: Cigarettes     Smokeless tobacco: Never Used     Tobacco comment: occasionall 1/wk   Substance and Sexual Activity     Alcohol use: Yes     Comment: social     Drug use: No     Sexual activity: Yes     Partners: Male     Birth control/protection: I.U.D.   Other Topics Concern     Parent/sibling w/ CABG, MI or angioplasty before 65F 55M? 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     Social Determinants of Health     Financial Resource Strain:      Difficulty of Paying Living Expenses:    Food Insecurity:      Worried About Running Out of Food in the Last Year:      Ran Out of Food in the Last Year:    Transportation Needs:      Lack of Transportation (Medical):      Lack of Transportation (Non-Medical):   "  Physical Activity:      Days of Exercise per Week:      Minutes of Exercise per Session:    Stress:      Feeling of Stress :    Social Connections:      Frequency of Communication with Friends and Family:      Frequency of Social Gatherings with Friends and Family:      Attends [employer] Services:      Active Member of Clubs or Organizations:      Attends Club or Organization Meetings:      Marital Status:    Intimate Partner Violence:      Fear of Current or Ex-Partner:      Emotionally Abused:      Physically Abused:      Sexually Abused:        Current Outpatient Medications:      amphetamine-dextroamphetamine (ADDERALL) 5 MG tablet, Take 1 tablet (5 mg) by mouth 2 times daily, Disp: 60 tablet, Rfl: 0     Cholecalciferol (VITAMIN D PO), Take 5,000 Int'l Units by mouth , Disp: , Rfl:      FLUoxetine (PROZAC) 40 MG capsule, Take 1 capsule (40 mg) by mouth daily, Disp: 90 capsule, Rfl: 3     Multiple Vitamin (MULTIVITAMIN) per tablet, Take 1 tablet by mouth daily., Disp: , Rfl:      Allergies   Allergen Reactions     Lactose        Past medical, surgical, social and family history were reviewed and updated in EPIC.    ROS:   C:     NEGATIVE for fever, chills, change in weight  I:       NEGATIVE for worrisome rashes, moles or lesions  E:     NEGATIVE for vision changes or irritation  E/M: POSITIVE recent sore throat  R:     POSITIVE for cough, recent neg Covid test. Denies SOB  CV:   NEGATIVE for chest pain, palpitations or peripheral edema  GI:     NEGATIVE for nausea, abdominal pain, heartburn, or change in bowel habits  :   NEGATIVE for frequency, dysuria, hematuria; POSITIVE vaginal odor and bleeding after IC  M:     NEGATIVE for significant arthralgias or myalgia  N:      NEGATIVE for weakness, dizziness or paresthesias  E:      NEGATIVE for temperature intolerance, skin/hair changes  P:      NEGATIVE for changes in mood or affect.    EXAM:  /81   Pulse 72   Ht 1.651 m (5' 5\")   Wt 83.9 kg (184 lb " 14.4 oz)   BMI 30.77 kg/m     BMI: Body mass index is 30.77 kg/m .  Constitutional: healthy, alert and no distress  Head: Normocephalic. No masses, lesions, tenderness or abnormalities  Neck: Neck supple. Trachea midline. No adenopathy. Thyroid symmetric, normal size.   Cardiovascular: RRR.   Respiratory: Negative.   Breast: No nodularity, asymmetry or nipple discharge bilaterally.  Gastrointestinal: Abdomen soft, non-tender, non-distended. No masses, organomegaly.  Musculoskeletal: extremities normal  Skin: no suspicious lesions or rashes  Psychiatric: Affect appropriate, cooperative,mentation appears normal.   Pelvic Exam:  Vulva: No external lesions, normal hair distribution, no adenopathy  Vagina: Moist, pink, no abnormal discharge, well rugated, no lesions  Cervix: Pap smear is taken, parous, smooth, pink, no visible lesions; IUD strings coming from OS  Uterus: Normal size, anteverted, non-tender, mobile  Ovaries: No mass, non-tender, mobile  Rectal exam: No mass, non-tender, normal sphincter tone, hemoccult negative  Wet prep: neg  GC/CT: pending    COUNSELING:   Reviewed preventive health counseling, as reflected in patient instructions   reports that she has quit smoking. Her smoking use included cigarettes. She has never used smokeless tobacco.    Body mass index is 30.77 kg/m .     ASSESSMENT:  30 year old female with satisfactory annual exam  (Z00.00) Annual physical exam  (primary encounter diagnosis)    (Z11.3) Screening for gonorrhea  Plan: Neisseria gonorrhoeae PCR    (Z11.8) Special screening examination for chlamydial disease  Plan: Chlamydia trachomatis PCR    (N93.0) Bleeding after intercourse  Plan: Chlamydia trachomatis PCR, Neisseria         gonorrhoeae PCR, Wet prep - Clinic Collect    (Z12.4) Screening for malignant neoplasm of cervix  Plan: Pap screen with HPV - recommended age 30 - 65         years  Consider pelvic U/S to assess IUD if infectious workup is negative.    Miki Esparza,  MARQUIS    (Z97.5) IUD (intrauterine device) in place    Miki Esparza CNM

## 2021-09-30 LAB
C TRACH DNA SPEC QL NAA+PROBE: NEGATIVE
N GONORRHOEA DNA SPEC QL NAA+PROBE: NEGATIVE

## 2021-10-01 LAB
BKR LAB AP GYN ADEQUACY: NORMAL
BKR LAB AP GYN INTERPRETATION: NORMAL
BKR LAB AP HPV REFLEX: NORMAL
BKR LAB AP PREVIOUS ABNORMAL: NORMAL
PATH REPORT.COMMENTS IMP SPEC: NORMAL
PATH REPORT.RELEVANT HX SPEC: NORMAL

## 2021-10-02 ENCOUNTER — HEALTH MAINTENANCE LETTER (OUTPATIENT)
Age: 31
End: 2021-10-02

## 2021-10-06 LAB
HUMAN PAPILLOMA VIRUS 16 DNA: NEGATIVE
HUMAN PAPILLOMA VIRUS 18 DNA: NEGATIVE
HUMAN PAPILLOMA VIRUS FINAL DIAGNOSIS: NORMAL
HUMAN PAPILLOMA VIRUS OTHER HR: NEGATIVE

## 2021-11-12 ENCOUNTER — LAB REQUISITION (OUTPATIENT)
Dept: LAB | Facility: CLINIC | Age: 31
End: 2021-11-12

## 2021-11-12 PROCEDURE — U0005 INFEC AGEN DETEC AMPLI PROBE: HCPCS | Performed by: INTERNAL MEDICINE

## 2021-11-14 LAB — SARS-COV-2 RNA RESP QL NAA+PROBE: POSITIVE

## 2022-01-22 ENCOUNTER — HEALTH MAINTENANCE LETTER (OUTPATIENT)
Age: 32
End: 2022-01-22

## 2022-09-03 ENCOUNTER — HEALTH MAINTENANCE LETTER (OUTPATIENT)
Age: 32
End: 2022-09-03

## 2023-04-29 ENCOUNTER — HEALTH MAINTENANCE LETTER (OUTPATIENT)
Age: 33
End: 2023-04-29

## 2024-07-06 ENCOUNTER — HEALTH MAINTENANCE LETTER (OUTPATIENT)
Age: 34
End: 2024-07-06